# Patient Record
Sex: FEMALE | Race: WHITE | NOT HISPANIC OR LATINO | Employment: FULL TIME | URBAN - METROPOLITAN AREA
[De-identification: names, ages, dates, MRNs, and addresses within clinical notes are randomized per-mention and may not be internally consistent; named-entity substitution may affect disease eponyms.]

---

## 2017-05-02 ENCOUNTER — COMMUNICATION - HEALTHEAST (OUTPATIENT)
Dept: FAMILY MEDICINE | Facility: CLINIC | Age: 52
End: 2017-05-02

## 2017-08-06 ENCOUNTER — COMMUNICATION - HEALTHEAST (OUTPATIENT)
Dept: FAMILY MEDICINE | Facility: CLINIC | Age: 52
End: 2017-08-06

## 2017-08-06 DIAGNOSIS — I10 HYPERTENSION: ICD-10-CM

## 2017-09-27 ENCOUNTER — COMMUNICATION - HEALTHEAST (OUTPATIENT)
Dept: FAMILY MEDICINE | Facility: CLINIC | Age: 52
End: 2017-09-27

## 2017-09-27 DIAGNOSIS — F32.A DEPRESSIVE DISORDER: ICD-10-CM

## 2017-10-14 ENCOUNTER — COMMUNICATION - HEALTHEAST (OUTPATIENT)
Dept: FAMILY MEDICINE | Facility: CLINIC | Age: 52
End: 2017-10-14

## 2017-10-14 DIAGNOSIS — I10 HTN (HYPERTENSION): ICD-10-CM

## 2017-10-14 DIAGNOSIS — I10 HYPERTENSION: ICD-10-CM

## 2017-10-16 ENCOUNTER — COMMUNICATION - HEALTHEAST (OUTPATIENT)
Dept: FAMILY MEDICINE | Facility: CLINIC | Age: 52
End: 2017-10-16

## 2017-10-16 DIAGNOSIS — F32.A DEPRESSIVE DISORDER: ICD-10-CM

## 2017-10-22 ENCOUNTER — COMMUNICATION - HEALTHEAST (OUTPATIENT)
Dept: FAMILY MEDICINE | Facility: CLINIC | Age: 52
End: 2017-10-22

## 2017-10-22 DIAGNOSIS — F32.A DEPRESSIVE DISORDER: ICD-10-CM

## 2017-11-08 ENCOUNTER — COMMUNICATION - HEALTHEAST (OUTPATIENT)
Dept: FAMILY MEDICINE | Facility: CLINIC | Age: 52
End: 2017-11-08

## 2017-11-08 DIAGNOSIS — I10 HYPERTENSION: ICD-10-CM

## 2017-12-09 ENCOUNTER — COMMUNICATION - HEALTHEAST (OUTPATIENT)
Dept: FAMILY MEDICINE | Facility: CLINIC | Age: 52
End: 2017-12-09

## 2017-12-09 DIAGNOSIS — I10 HYPERTENSION: ICD-10-CM

## 2017-12-11 ENCOUNTER — COMMUNICATION - HEALTHEAST (OUTPATIENT)
Dept: FAMILY MEDICINE | Facility: CLINIC | Age: 52
End: 2017-12-11

## 2017-12-11 DIAGNOSIS — I10 HYPERTENSION: ICD-10-CM

## 2017-12-13 ENCOUNTER — COMMUNICATION - HEALTHEAST (OUTPATIENT)
Dept: SCHEDULING | Facility: CLINIC | Age: 52
End: 2017-12-13

## 2017-12-13 ENCOUNTER — AMBULATORY - HEALTHEAST (OUTPATIENT)
Dept: FAMILY MEDICINE | Facility: CLINIC | Age: 52
End: 2017-12-13

## 2017-12-13 DIAGNOSIS — I10 HYPERTENSION, UNSPECIFIED TYPE: ICD-10-CM

## 2017-12-15 ENCOUNTER — OFFICE VISIT - HEALTHEAST (OUTPATIENT)
Dept: FAMILY MEDICINE | Facility: CLINIC | Age: 52
End: 2017-12-15

## 2017-12-15 DIAGNOSIS — I10 ESSENTIAL HYPERTENSION: ICD-10-CM

## 2017-12-15 DIAGNOSIS — I10 HYPERTENSION, UNSPECIFIED TYPE: ICD-10-CM

## 2017-12-15 DIAGNOSIS — Z12.11 SCREEN FOR COLON CANCER: ICD-10-CM

## 2017-12-15 LAB
CHOLEST SERPL-MCNC: 142 MG/DL
FASTING STATUS PATIENT QL REPORTED: NO
HDLC SERPL-MCNC: 46 MG/DL
LDLC SERPL CALC-MCNC: 71 MG/DL
TRIGL SERPL-MCNC: 123 MG/DL

## 2017-12-18 ENCOUNTER — COMMUNICATION - HEALTHEAST (OUTPATIENT)
Dept: FAMILY MEDICINE | Facility: CLINIC | Age: 52
End: 2017-12-18

## 2017-12-20 ENCOUNTER — COMMUNICATION - HEALTHEAST (OUTPATIENT)
Dept: FAMILY MEDICINE | Facility: CLINIC | Age: 52
End: 2017-12-20

## 2018-01-09 ENCOUNTER — COMMUNICATION - HEALTHEAST (OUTPATIENT)
Dept: FAMILY MEDICINE | Facility: CLINIC | Age: 53
End: 2018-01-09

## 2018-01-09 DIAGNOSIS — I10 HTN (HYPERTENSION): ICD-10-CM

## 2018-01-13 ENCOUNTER — COMMUNICATION - HEALTHEAST (OUTPATIENT)
Dept: FAMILY MEDICINE | Facility: CLINIC | Age: 53
End: 2018-01-13

## 2018-01-13 DIAGNOSIS — I10 HYPERTENSION: ICD-10-CM

## 2018-01-21 ENCOUNTER — COMMUNICATION - HEALTHEAST (OUTPATIENT)
Dept: FAMILY MEDICINE | Facility: CLINIC | Age: 53
End: 2018-01-21

## 2018-01-21 DIAGNOSIS — F32.A DEPRESSIVE DISORDER: ICD-10-CM

## 2018-02-07 ENCOUNTER — OFFICE VISIT - HEALTHEAST (OUTPATIENT)
Dept: FAMILY MEDICINE | Facility: CLINIC | Age: 53
End: 2018-02-07

## 2018-02-07 DIAGNOSIS — J32.9 SINUS INFECTION: ICD-10-CM

## 2018-02-07 ASSESSMENT — MIFFLIN-ST. JEOR: SCORE: 1858.25

## 2018-03-13 ENCOUNTER — COMMUNICATION - HEALTHEAST (OUTPATIENT)
Dept: FAMILY MEDICINE | Facility: CLINIC | Age: 53
End: 2018-03-13

## 2018-03-13 DIAGNOSIS — I10 HYPERTENSION, UNSPECIFIED TYPE: ICD-10-CM

## 2018-04-04 ENCOUNTER — COMMUNICATION - HEALTHEAST (OUTPATIENT)
Dept: FAMILY MEDICINE | Facility: CLINIC | Age: 53
End: 2018-04-04

## 2018-04-04 DIAGNOSIS — J32.9 SINUS INFECTION: ICD-10-CM

## 2018-04-24 ENCOUNTER — COMMUNICATION - HEALTHEAST (OUTPATIENT)
Dept: FAMILY MEDICINE | Facility: CLINIC | Age: 53
End: 2018-04-24

## 2018-04-24 DIAGNOSIS — F32.A DEPRESSIVE DISORDER: ICD-10-CM

## 2018-06-03 ENCOUNTER — COMMUNICATION - HEALTHEAST (OUTPATIENT)
Dept: FAMILY MEDICINE | Facility: CLINIC | Age: 53
End: 2018-06-03

## 2018-06-03 DIAGNOSIS — I10 HTN (HYPERTENSION): ICD-10-CM

## 2018-12-27 ENCOUNTER — COMMUNICATION - HEALTHEAST (OUTPATIENT)
Dept: FAMILY MEDICINE | Facility: CLINIC | Age: 53
End: 2018-12-27

## 2018-12-27 DIAGNOSIS — I10 HYPERTENSION, UNSPECIFIED TYPE: ICD-10-CM

## 2019-01-05 ENCOUNTER — COMMUNICATION - HEALTHEAST (OUTPATIENT)
Dept: FAMILY MEDICINE | Facility: CLINIC | Age: 54
End: 2019-01-05

## 2019-01-05 DIAGNOSIS — I10 HYPERTENSION: ICD-10-CM

## 2019-01-05 DIAGNOSIS — I10 HTN (HYPERTENSION): ICD-10-CM

## 2019-03-28 ENCOUNTER — COMMUNICATION - HEALTHEAST (OUTPATIENT)
Dept: FAMILY MEDICINE | Facility: CLINIC | Age: 54
End: 2019-03-28

## 2019-03-28 DIAGNOSIS — I10 HYPERTENSION, UNSPECIFIED TYPE: ICD-10-CM

## 2019-04-03 ENCOUNTER — OFFICE VISIT - HEALTHEAST (OUTPATIENT)
Dept: FAMILY MEDICINE | Facility: CLINIC | Age: 54
End: 2019-04-03

## 2019-04-03 DIAGNOSIS — F32.A DEPRESSIVE DISORDER: ICD-10-CM

## 2019-04-03 DIAGNOSIS — Z12.11 SCREEN FOR COLON CANCER: ICD-10-CM

## 2019-04-03 DIAGNOSIS — Z00.00 ANNUAL PHYSICAL EXAM: ICD-10-CM

## 2019-04-03 DIAGNOSIS — Z12.31 VISIT FOR SCREENING MAMMOGRAM: ICD-10-CM

## 2019-04-03 DIAGNOSIS — L98.9 SKIN LESION: ICD-10-CM

## 2019-04-03 DIAGNOSIS — E66.01 MORBID OBESITY (H): ICD-10-CM

## 2019-04-18 ENCOUNTER — COMMUNICATION - HEALTHEAST (OUTPATIENT)
Dept: ADMINISTRATIVE | Facility: CLINIC | Age: 54
End: 2019-04-18

## 2019-05-03 ENCOUNTER — COMMUNICATION - HEALTHEAST (OUTPATIENT)
Dept: FAMILY MEDICINE | Facility: CLINIC | Age: 54
End: 2019-05-03

## 2019-05-04 ENCOUNTER — COMMUNICATION - HEALTHEAST (OUTPATIENT)
Dept: FAMILY MEDICINE | Facility: CLINIC | Age: 54
End: 2019-05-04

## 2019-05-04 DIAGNOSIS — I10 HYPERTENSION, UNSPECIFIED TYPE: ICD-10-CM

## 2019-05-08 ENCOUNTER — COMMUNICATION - HEALTHEAST (OUTPATIENT)
Dept: FAMILY MEDICINE | Facility: CLINIC | Age: 54
End: 2019-05-08

## 2019-05-08 DIAGNOSIS — F32.A DEPRESSIVE DISORDER: ICD-10-CM

## 2019-05-29 DIAGNOSIS — J01.00 ACUTE NON-RECURRENT MAXILLARY SINUSITIS: ICD-10-CM

## 2019-06-06 ENCOUNTER — COMMUNICATION - HEALTHEAST (OUTPATIENT)
Dept: FAMILY MEDICINE | Facility: CLINIC | Age: 54
End: 2019-06-06

## 2019-06-06 DIAGNOSIS — I10 HYPERTENSION, UNSPECIFIED TYPE: ICD-10-CM

## 2019-07-14 ENCOUNTER — COMMUNICATION - HEALTHEAST (OUTPATIENT)
Dept: FAMILY MEDICINE | Facility: CLINIC | Age: 54
End: 2019-07-14

## 2019-07-14 DIAGNOSIS — I10 HTN (HYPERTENSION): ICD-10-CM

## 2019-07-15 ENCOUNTER — COMMUNICATION - HEALTHEAST (OUTPATIENT)
Dept: FAMILY MEDICINE | Facility: CLINIC | Age: 54
End: 2019-07-15

## 2019-07-15 DIAGNOSIS — I10 HYPERTENSION, UNSPECIFIED TYPE: ICD-10-CM

## 2019-08-01 ENCOUNTER — COMMUNICATION - HEALTHEAST (OUTPATIENT)
Dept: FAMILY MEDICINE | Facility: CLINIC | Age: 54
End: 2019-08-01

## 2019-08-09 ENCOUNTER — COMMUNICATION - HEALTHEAST (OUTPATIENT)
Dept: FAMILY MEDICINE | Facility: CLINIC | Age: 54
End: 2019-08-09

## 2019-08-12 ENCOUNTER — COMMUNICATION - HEALTHEAST (OUTPATIENT)
Dept: FAMILY MEDICINE | Facility: CLINIC | Age: 54
End: 2019-08-12

## 2019-08-12 DIAGNOSIS — I10 HYPERTENSION, UNSPECIFIED TYPE: ICD-10-CM

## 2019-08-13 ENCOUNTER — COMMUNICATION - HEALTHEAST (OUTPATIENT)
Dept: FAMILY MEDICINE | Facility: CLINIC | Age: 54
End: 2019-08-13

## 2019-08-13 DIAGNOSIS — I10 ESSENTIAL HYPERTENSION: ICD-10-CM

## 2019-10-03 ENCOUNTER — COMMUNICATION - HEALTHEAST (OUTPATIENT)
Dept: FAMILY MEDICINE | Facility: CLINIC | Age: 54
End: 2019-10-03

## 2019-10-03 DIAGNOSIS — I10 HYPERTENSION, UNSPECIFIED TYPE: ICD-10-CM

## 2019-10-05 ENCOUNTER — COMMUNICATION - HEALTHEAST (OUTPATIENT)
Dept: FAMILY MEDICINE | Facility: CLINIC | Age: 54
End: 2019-10-05

## 2019-10-05 DIAGNOSIS — I10 HTN (HYPERTENSION): ICD-10-CM

## 2019-10-31 ENCOUNTER — COMMUNICATION - HEALTHEAST (OUTPATIENT)
Dept: FAMILY MEDICINE | Facility: CLINIC | Age: 54
End: 2019-10-31

## 2019-10-31 DIAGNOSIS — I10 HYPERTENSION, UNSPECIFIED TYPE: ICD-10-CM

## 2019-12-04 ENCOUNTER — COMMUNICATION - HEALTHEAST (OUTPATIENT)
Dept: FAMILY MEDICINE | Facility: CLINIC | Age: 54
End: 2019-12-04

## 2019-12-04 DIAGNOSIS — I10 HYPERTENSION, UNSPECIFIED TYPE: ICD-10-CM

## 2020-01-03 ENCOUNTER — COMMUNICATION - HEALTHEAST (OUTPATIENT)
Dept: FAMILY MEDICINE | Facility: CLINIC | Age: 55
End: 2020-01-03

## 2020-01-03 DIAGNOSIS — I10 HYPERTENSION: ICD-10-CM

## 2020-02-07 ENCOUNTER — COMMUNICATION - HEALTHEAST (OUTPATIENT)
Dept: FAMILY MEDICINE | Facility: CLINIC | Age: 55
End: 2020-02-07

## 2020-02-07 DIAGNOSIS — I10 HYPERTENSION: ICD-10-CM

## 2020-03-26 ENCOUNTER — COMMUNICATION - HEALTHEAST (OUTPATIENT)
Dept: FAMILY MEDICINE | Facility: CLINIC | Age: 55
End: 2020-03-26

## 2020-03-26 DIAGNOSIS — I10 HTN (HYPERTENSION): ICD-10-CM

## 2020-04-23 ENCOUNTER — COMMUNICATION - HEALTHEAST (OUTPATIENT)
Dept: FAMILY MEDICINE | Facility: CLINIC | Age: 55
End: 2020-04-23

## 2020-04-23 DIAGNOSIS — F32.A DEPRESSIVE DISORDER: ICD-10-CM

## 2020-05-09 ENCOUNTER — COMMUNICATION - HEALTHEAST (OUTPATIENT)
Dept: FAMILY MEDICINE | Facility: CLINIC | Age: 55
End: 2020-05-09

## 2020-05-09 DIAGNOSIS — I10 HYPERTENSION: ICD-10-CM

## 2020-06-09 ENCOUNTER — COMMUNICATION - HEALTHEAST (OUTPATIENT)
Dept: FAMILY MEDICINE | Facility: CLINIC | Age: 55
End: 2020-06-09

## 2020-06-09 DIAGNOSIS — I10 HYPERTENSION, UNSPECIFIED TYPE: ICD-10-CM

## 2020-06-09 DIAGNOSIS — I10 HYPERTENSION: ICD-10-CM

## 2020-06-16 ENCOUNTER — COMMUNICATION - HEALTHEAST (OUTPATIENT)
Dept: FAMILY MEDICINE | Facility: CLINIC | Age: 55
End: 2020-06-16

## 2020-06-16 DIAGNOSIS — I10 HTN (HYPERTENSION): ICD-10-CM

## 2020-06-17 ENCOUNTER — OFFICE VISIT - HEALTHEAST (OUTPATIENT)
Dept: FAMILY MEDICINE | Facility: CLINIC | Age: 55
End: 2020-06-17

## 2020-06-17 DIAGNOSIS — B49 FUNGAL INFECTION: ICD-10-CM

## 2020-06-17 DIAGNOSIS — F32.A DEPRESSIVE DISORDER: ICD-10-CM

## 2020-06-17 DIAGNOSIS — M79.10 MUSCLE ACHE: ICD-10-CM

## 2020-06-17 DIAGNOSIS — Z12.31 VISIT FOR SCREENING MAMMOGRAM: ICD-10-CM

## 2020-06-17 DIAGNOSIS — I10 HYPERTENSION, UNSPECIFIED TYPE: ICD-10-CM

## 2020-06-17 RX ORDER — SPIRONOLACTONE 50 MG/1
50 TABLET, FILM COATED ORAL DAILY
Qty: 90 TABLET | Refills: 3 | Status: SHIPPED | OUTPATIENT
Start: 2020-06-17 | End: 2021-07-23

## 2020-06-17 RX ORDER — CYCLOBENZAPRINE HCL 5 MG
5 TABLET ORAL EVERY 8 HOURS PRN
Qty: 30 TABLET | Refills: 1 | Status: SHIPPED | OUTPATIENT
Start: 2020-06-17 | End: 2021-10-04

## 2020-06-17 RX ORDER — LOSARTAN POTASSIUM 100 MG/1
100 TABLET ORAL DAILY
Qty: 90 TABLET | Refills: 3 | Status: SHIPPED | OUTPATIENT
Start: 2020-06-17 | End: 2021-07-23

## 2020-06-17 RX ORDER — HYDROCHLOROTHIAZIDE 25 MG/1
25 TABLET ORAL DAILY
Qty: 90 TABLET | Refills: 3 | Status: SHIPPED | OUTPATIENT
Start: 2020-06-17 | End: 2021-07-23

## 2020-06-17 RX ORDER — METOPROLOL SUCCINATE 100 MG/1
150 TABLET, EXTENDED RELEASE ORAL DAILY
Qty: 135 TABLET | Refills: 3 | Status: SHIPPED | OUTPATIENT
Start: 2020-06-17 | End: 2021-07-23

## 2020-06-17 RX ORDER — NYSTATIN 100000/G
POWDER (GRAM) TOPICAL
Qty: 15 G | Refills: 0 | Status: SHIPPED | OUTPATIENT
Start: 2020-06-17 | End: 2021-06-17

## 2020-06-17 RX ORDER — ESCITALOPRAM OXALATE 10 MG/1
10 TABLET ORAL DAILY
Qty: 90 TABLET | Refills: 3 | Status: SHIPPED | OUTPATIENT
Start: 2020-06-17 | End: 2021-07-23

## 2020-06-17 ASSESSMENT — PATIENT HEALTH QUESTIONNAIRE - PHQ9: SUM OF ALL RESPONSES TO PHQ QUESTIONS 1-9: 0

## 2020-07-11 ENCOUNTER — COMMUNICATION - HEALTHEAST (OUTPATIENT)
Dept: FAMILY MEDICINE | Facility: CLINIC | Age: 55
End: 2020-07-11

## 2020-07-11 DIAGNOSIS — F32.A DEPRESSIVE DISORDER: ICD-10-CM

## 2020-12-07 ENCOUNTER — COMMUNICATION - HEALTHEAST (OUTPATIENT)
Dept: FAMILY MEDICINE | Facility: CLINIC | Age: 55
End: 2020-12-07

## 2021-05-27 ASSESSMENT — PATIENT HEALTH QUESTIONNAIRE - PHQ9: SUM OF ALL RESPONSES TO PHQ QUESTIONS 1-9: 0

## 2021-05-27 NOTE — TELEPHONE ENCOUNTER
RN cannot approve Refill Request    RN can NOT refill this medication PCP messaged that patient is overdue for Labs and Office Visit.      Yvette Palacios, [facility] Triage/Med Refill 3/28/2019    Requested Prescriptions   Pending Prescriptions Disp Refills     losartan-hydrochlorothiazide (HYZAAR) 100-25 mg per tablet [Pharmacy Med Name: LOSARTAN-HCTZ 100-25 MG TAB] 90 tablet 3     Sig: TAKE 1 TABLET BY MOUTH EVERY DAY    Diuretics/Combination Diuretics Refill Protocol  Failed - 3/28/2019  8:02 AM       Failed - Visit with PCP or prescribing provider visit in past 12 months    Last office visit with prescriber/PCP: 2/7/2018 Dang Don FNP OR same dept: Visit date not found OR same specialty: 2/7/2018 Dang Don FNP  Last physical: Visit date not found Last MTM visit: Visit date not found   Next visit within 3 mo: Visit date not found  Next physical within 3 mo: Visit date not found  Prescriber OR PCP: ZULEIKA Franklin  Last diagnosis associated with med order: 1. Hypertension, unspecified type  - losartan-hydrochlorothiazide (HYZAAR) 100-25 mg per tablet [Pharmacy Med Name: LOSARTAN-HCTZ 100-25 MG TAB]; TAKE 1 TABLET BY MOUTH EVERY DAY  Dispense: 90 tablet; Refill: 0    If protocol passes may refill for 12 months if within 3 months of last provider visit (or a total of 15 months).            Failed - Serum Potassium in past 12 months     No results found for: LN-POTASSIUM         Failed - Serum Sodium in past 12 months     No results found for: LN-SODIUM         Failed - Blood pressure on file in past 12 months    BP Readings from Last 1 Encounters:   02/07/18 (!) 150/110            Failed - Serum Creatinine in past 12 months     Creatinine   Date Value Ref Range Status   12/15/2017 0.79 0.60 - 1.10 mg/dL Final

## 2021-05-28 NOTE — TELEPHONE ENCOUNTER
RN cannot approve Refill Request    RN can NOT refill this medication request is for higher dose than what was prescribed on 4/3/19.     Donny Donis, [facility] Triage/Med Refill 5/8/2019    Requested Prescriptions   Refused Prescriptions Disp Refills     escitalopram oxalate (LEXAPRO) 20 MG tablet [Pharmacy Med Name: ESCITALOPRAM 20 MG TABLET] 90 tablet 0     Sig: TAKE 1 TABLET (20 MG TOTAL) BY MOUTH DAILY.       [facility] Refill Protocol  Passed - 5/8/2019  1:25 AM        Passed - PCP or prescribing provider visit in last year     Last office visit with prescriber/PCP: 2/7/2018 Dang Don FNP OR same dept: Visit date not found OR same specialty: 2/7/2018 Dang Don FNP  Last physical: 4/3/2019 Last MTM visit: Visit date not found   Next visit within 3 mo: Visit date not found  Next physical within 3 mo: Visit date not found  Prescriber OR PCP: ZULEIKA Franklin  Last diagnosis associated with med order: 1. Depressive disorder  - escitalopram oxalate (LEXAPRO) 20 MG tablet [Pharmacy Med Name: ESCITALOPRAM 20 MG TABLET]; TAKE 1 TABLET (20 MG TOTAL) BY MOUTH DAILY.  Dispense: 90 tablet; Refill: 0    If protocol passes may refill for 12 months if within 3 months of last provider visit (or a total of 15 months).

## 2021-05-28 NOTE — TELEPHONE ENCOUNTER
RN cannot approve Refill Request    RN can NOT refill this medication Protocol failed and NO refill given. Last office visit: 2/7/2018 Dang Don FNP Last Physical: 4/3/2019 Last MTM visit: Visit date not found Last visit same specialty: 2/7/2018 Dang Don FNP.  Next visit within 3 mo: Visit date not found  Next physical within 3 mo: Visit date not found      Ann-Marie Richey, [facility] Triage/Med Refill 5/5/2019    Requested Prescriptions   Pending Prescriptions Disp Refills     losartan-hydrochlorothiazide (HYZAAR) 100-25 mg per tablet [Pharmacy Med Name: LOSARTAN-HCTZ 100-25 MG TAB] 30 tablet 0     Sig: TAKE 1 TABLET BY MOUTH EVERY DAY       Diuretics/Combination Diuretics Refill Protocol  Failed - 5/4/2019 12:27 PM        Failed - Serum Potassium in past 12 months      No results found for: LN-POTASSIUM          Failed - Serum Sodium in past 12 months      No results found for: LN-SODIUM          Failed - Serum Creatinine in past 12 months      Creatinine   Date Value Ref Range Status   12/15/2017 0.79 0.60 - 1.10 mg/dL Final             Passed - Visit with PCP or prescribing provider visit in past 12 months     Last office visit with prescriber/PCP: 2/7/2018 Dang Don FNP OR same dept: Visit date not found OR same specialty: 2/7/2018 Dang Don FNP  Last physical: 4/3/2019 Last MTM visit: Visit date not found   Next visit within 3 mo: Visit date not found  Next physical within 3 mo: Visit date not found  Prescriber OR PCP: ZULEIKA Franklin  Last diagnosis associated with med order: 1. Hypertension, unspecified type  - losartan-hydrochlorothiazide (HYZAAR) 100-25 mg per tablet [Pharmacy Med Name: LOSARTAN-HCTZ 100-25 MG TAB]; TAKE 1 TABLET BY MOUTH EVERY DAY  Dispense: 30 tablet; Refill: 0    If protocol passes may refill for 12 months if within 3 months of last provider visit (or a total of 15 months).             Passed - Blood pressure on file in past 12  months     BP Readings from Last 1 Encounters:   04/03/19 110/74

## 2021-05-29 NOTE — TELEPHONE ENCOUNTER
RN cannot approve Refill Request    RN can NOT refill this medication Protocol failed and NO refill given. Last office visit: 2/7/2018 Dang Don FNP Last Physical: 4/3/2019 Last MTM visit: Visit date not found Last visit same specialty: 2/7/2018 Dang Don FNP.  Next visit within 3 mo: Visit date not found  Next physical within 3 mo: Visit date not found      Tatum Ball, [facility] Triage/Med Refill 6/8/2019    Requested Prescriptions   Pending Prescriptions Disp Refills     losartan-hydrochlorothiazide (HYZAAR) 100-25 mg per tablet [Pharmacy Med Name: LOSARTAN-HCTZ 100-25 MG TAB] 30 tablet 0     Sig: TAKE 1 TABLET BY MOUTH EVERY DAY       Diuretics/Combination Diuretics Refill Protocol  Failed - 6/6/2019  1:31 AM        Failed - Serum Potassium in past 12 months      No results found for: LN-POTASSIUM          Failed - Serum Sodium in past 12 months      No results found for: LN-SODIUM          Failed - Serum Creatinine in past 12 months      Creatinine   Date Value Ref Range Status   12/15/2017 0.79 0.60 - 1.10 mg/dL Final             Passed - Visit with PCP or prescribing provider visit in past 12 months     Last office visit with prescriber/PCP: 2/7/2018 Dang Don FNP OR same dept: Visit date not found OR same specialty: 2/7/2018 Dang Don FNP  Last physical: 4/3/2019 Last MTM visit: Visit date not found   Next visit within 3 mo: Visit date not found  Next physical within 3 mo: Visit date not found  Prescriber OR PCP: ZULEIKA Franklin  Last diagnosis associated with med order: 1. Hypertension, unspecified type  - losartan-hydrochlorothiazide (HYZAAR) 100-25 mg per tablet [Pharmacy Med Name: LOSARTAN-HCTZ 100-25 MG TAB]; TAKE 1 TABLET BY MOUTH EVERY DAY  Dispense: 30 tablet; Refill: 0    If protocol passes may refill for 12 months if within 3 months of last provider visit (or a total of 15 months).             Passed - Blood pressure on file in past 12  months     BP Readings from Last 1 Encounters:   04/03/19 110/74

## 2021-05-30 NOTE — TELEPHONE ENCOUNTER
RN cannot approve Refill Request    RN can NOT refill this medication PCP messaged that patient is overdue for Labs. Last office visit: 2/7/2018 Dang Don FNP Last Physical: 4/3/2019 Last MTM visit: Visit date not found Last visit same specialty: 2/7/2018 Dang Don FNP.  Next visit within 3 mo: Visit date not found  Next physical within 3 mo: Visit date not found      Catalina Rueda, [facility] Triage/Med Refill 7/16/2019    Requested Prescriptions   Pending Prescriptions Disp Refills     losartan-hydrochlorothiazide (HYZAAR) 100-25 mg per tablet [Pharmacy Med Name: LOSARTAN-HCTZ 100-25 MG TAB] 30 tablet 0     Sig: TAKE 1 TABLET BY MOUTH EVERY DAY       Diuretics/Combination Diuretics Refill Protocol  Failed - 7/15/2019  1:32 PM        Failed - Serum Potassium in past 12 months      No results found for: LN-POTASSIUM          Failed - Serum Sodium in past 12 months      No results found for: LN-SODIUM          Failed - Serum Creatinine in past 12 months      Creatinine   Date Value Ref Range Status   12/15/2017 0.79 0.60 - 1.10 mg/dL Final             Passed - Visit with PCP or prescribing provider visit in past 12 months     Last office visit with prescriber/PCP: 2/7/2018 Dang Don FNP OR same dept: Visit date not found OR same specialty: 2/7/2018 Dang Don FNP  Last physical: 4/3/2019 Last MTM visit: Visit date not found   Next visit within 3 mo: Visit date not found  Next physical within 3 mo: Visit date not found  Prescriber OR PCP: ZULEIKA Franklin  Last diagnosis associated with med order: 1. Hypertension, unspecified type  - losartan-hydrochlorothiazide (HYZAAR) 100-25 mg per tablet [Pharmacy Med Name: LOSARTAN-HCTZ 100-25 MG TAB]; TAKE 1 TABLET BY MOUTH EVERY DAY  Dispense: 30 tablet; Refill: 0    If protocol passes may refill for 12 months if within 3 months of last provider visit (or a total of 15 months).             Passed - Blood pressure on file in past  12 months     BP Readings from Last 1 Encounters:   04/03/19 110/74

## 2021-05-30 NOTE — TELEPHONE ENCOUNTER
Refill Approved    Rx renewed per Medication Renewal Policy. Medication was last renewed on 1/6/19  OV 4/3/19.    Natalie Simmons, [facility] Triage/Med Refill 7/14/2019     Requested Prescriptions   Pending Prescriptions Disp Refills     metoprolol succinate (TOPROL-XL) 100 MG 24 hr tablet [Pharmacy Med Name: METOPROLOL SUCC  MG TAB] 135 tablet 1     Sig: TAKE 1 & 1/2 TABLETS BY MOUTH EVERY DAY       Beta-Blockers Refill Protocol Passed - 7/14/2019 11:31 AM        Passed - PCP or prescribing provider visit in past 12 months or next 3 months     Last office visit with prescriber/PCP: 2/7/2018 Dang Don FNP OR same dept: Visit date not found OR same specialty: 2/7/2018 Dang Don FNP  Last physical: 4/3/2019 Last MTM visit: Visit date not found   Next visit within 3 mo: Visit date not found  Next physical within 3 mo: Visit date not found  Prescriber OR PCP: ZULEIKA Franklin  Last diagnosis associated with med order: 1. HTN (hypertension)  - metoprolol succinate (TOPROL-XL) 100 MG 24 hr tablet [Pharmacy Med Name: METOPROLOL SUCC  MG TAB]; TAKE 1 & 1/2 TABLETS BY MOUTH EVERY DAY  Dispense: 135 tablet; Refill: 1    If protocol passes may refill for 12 months if within 3 months of last provider visit (or a total of 15 months).             Passed - Blood pressure filed in past 12 months     BP Readings from Last 1 Encounters:   04/03/19 110/74

## 2021-05-31 ENCOUNTER — RECORDS - HEALTHEAST (OUTPATIENT)
Dept: ADMINISTRATIVE | Facility: CLINIC | Age: 56
End: 2021-05-31

## 2021-05-31 VITALS — WEIGHT: 284.2 LBS | BODY MASS INDEX: 50.36 KG/M2 | HEIGHT: 63 IN

## 2021-05-31 VITALS — BODY MASS INDEX: 51.42 KG/M2 | WEIGHT: 290.3 LBS

## 2021-05-31 NOTE — TELEPHONE ENCOUNTER
RN cannot approve Refill Request    RN can NOT refill this medication PCP messaged that patient is overdue for Labs. Last office visit: 2/7/2018 Dang Don FNP Last Physical: 4/3/2019 Last MTM visit: Visit date not found Last visit same specialty: 2/7/2018 Dang Don FNP.  Next visit within 3 mo: Visit date not found  Next physical within 3 mo: Visit date not found      Krista A Ludwin, [facility] Triage/Med Refill 8/12/2019    Requested Prescriptions   Pending Prescriptions Disp Refills     losartan-hydrochlorothiazide (HYZAAR) 100-25 mg per tablet [Pharmacy Med Name: LOSARTAN-HCTZ 100-25 MG TAB] 30 tablet 0     Sig: TAKE 1 TABLET BY MOUTH EVERY DAY       Diuretics/Combination Diuretics Refill Protocol  Failed - 8/12/2019  9:33 AM        Failed - Serum Potassium in past 12 months      No results found for: LN-POTASSIUM          Failed - Serum Sodium in past 12 months      No results found for: LN-SODIUM          Failed - Serum Creatinine in past 12 months      Creatinine   Date Value Ref Range Status   12/15/2017 0.79 0.60 - 1.10 mg/dL Final             Passed - Visit with PCP or prescribing provider visit in past 12 months     Last office visit with prescriber/PCP: 2/7/2018 Dang Don FNP OR same dept: Visit date not found OR same specialty: 2/7/2018 Dang Don FNP  Last physical: 4/3/2019 Last MTM visit: Visit date not found   Next visit within 3 mo: Visit date not found  Next physical within 3 mo: Visit date not found  Prescriber OR PCP: ZULEIKA Franklin  Last diagnosis associated with med order: 1. Hypertension, unspecified type  - losartan-hydrochlorothiazide (HYZAAR) 100-25 mg per tablet [Pharmacy Med Name: LOSARTAN-HCTZ 100-25 MG TAB]; TAKE 1 TABLET BY MOUTH EVERY DAY  Dispense: 30 tablet; Refill: 0    If protocol passes may refill for 12 months if within 3 months of last provider visit (or a total of 15 months).             Passed - Blood pressure on file in  past 12 months     BP Readings from Last 1 Encounters:   04/03/19 110/74

## 2021-06-02 VITALS — BODY MASS INDEX: 50.84 KG/M2 | WEIGHT: 287 LBS

## 2021-06-02 NOTE — TELEPHONE ENCOUNTER
RN cannot approve Refill Request    RN can NOT refill this medication PCP messaged that patient is overdue for Labs. Last office visit: 2/7/2018 Dang Don FNP Last Physical: 4/3/2019 Last MTM visit: Visit date not found Last visit same specialty: 2/7/2018 Dang Don FNP.  Next visit within 3 mo: Visit date not found  Next physical within 3 mo: Visit date not found      Jez Crawford, [facility] Triage/Med Refill 10/31/2019    Requested Prescriptions   Pending Prescriptions Disp Refills     losartan-hydrochlorothiazide (HYZAAR) 100-25 mg per tablet [Pharmacy Med Name: LOSARTAN-HCTZ 100-25 MG TAB] 30 tablet 0     Sig: TAKE 1 TABLET BY MOUTH EVERY DAY       Diuretics/Combination Diuretics Refill Protocol  Failed - 10/31/2019  7:37 AM        Failed - Serum Potassium in past 12 months      No results found for: LN-POTASSIUM          Failed - Serum Sodium in past 12 months      No results found for: LN-SODIUM          Failed - Serum Creatinine in past 12 months      Creatinine   Date Value Ref Range Status   12/15/2017 0.79 0.60 - 1.10 mg/dL Final             Passed - Visit with PCP or prescribing provider visit in past 12 months     Last office visit with prescriber/PCP: 2/7/2018 Dang Don FNP OR same dept: Visit date not found OR same specialty: 2/7/2018 Dang Don FNP  Last physical: 4/3/2019 Last MTM visit: Visit date not found   Next visit within 3 mo: Visit date not found  Next physical within 3 mo: Visit date not found  Prescriber OR PCP: ZLUEIKA Franklin  Last diagnosis associated with med order: 1. Hypertension, unspecified type  - losartan-hydrochlorothiazide (HYZAAR) 100-25 mg per tablet [Pharmacy Med Name: LOSARTAN-HCTZ 100-25 MG TAB]; TAKE 1 TABLET BY MOUTH EVERY DAY  Dispense: 30 tablet; Refill: 0    If protocol passes may refill for 12 months if within 3 months of last provider visit (or a total of 15 months).             Passed - Blood pressure on file in  past 12 months     BP Readings from Last 1 Encounters:   04/03/19 110/74

## 2021-06-02 NOTE — TELEPHONE ENCOUNTER
RN cannot approve Refill Request    RN can NOT refill this medication PCP messaged that patient is overdue for Labs. Last office visit: 2/7/2018 Dang Don FNP Last Physical: 4/3/2019 Last MTM visit: Visit date not found Last visit same specialty: 2/7/2018 Dang Don FNP.  Next visit within 3 mo: Visit date not found  Next physical within 3 mo: Visit date not found      Jez Crawford, [facility] Triage/Med Refill 10/3/2019    Requested Prescriptions   Pending Prescriptions Disp Refills     losartan-hydrochlorothiazide (HYZAAR) 100-25 mg per tablet [Pharmacy Med Name: LOSARTAN-HCTZ 100-25 MG TAB] 30 tablet 0     Sig: TAKE 1 TABLET BY MOUTH EVERY DAY       Diuretics/Combination Diuretics Refill Protocol  Failed - 10/3/2019  9:34 AM        Failed - Serum Potassium in past 12 months      No results found for: LN-POTASSIUM          Failed - Serum Sodium in past 12 months      No results found for: LN-SODIUM          Failed - Serum Creatinine in past 12 months      Creatinine   Date Value Ref Range Status   12/15/2017 0.79 0.60 - 1.10 mg/dL Final             Passed - Visit with PCP or prescribing provider visit in past 12 months     Last office visit with prescriber/PCP: 2/7/2018 Dang Don FNP OR same dept: Visit date not found OR same specialty: 2/7/2018 Dang Don FNP  Last physical: 4/3/2019 Last MTM visit: Visit date not found   Next visit within 3 mo: Visit date not found  Next physical within 3 mo: Visit date not found  Prescriber OR PCP: ZULEIKA Franklin  Last diagnosis associated with med order: 1. Hypertension, unspecified type  - losartan-hydrochlorothiazide (HYZAAR) 100-25 mg per tablet [Pharmacy Med Name: LOSARTAN-HCTZ 100-25 MG TAB]; TAKE 1 TABLET BY MOUTH EVERY DAY  Dispense: 30 tablet; Refill: 0    If protocol passes may refill for 12 months if within 3 months of last provider visit (or a total of 15 months).             Passed - Blood pressure on file in  past 12 months     BP Readings from Last 1 Encounters:   04/03/19 110/74

## 2021-06-02 NOTE — TELEPHONE ENCOUNTER
Refill Approved    Rx renewed per Medication Renewal Policy. Medication was last renewed on 7/14/19.    Yvette Palacios, [facility] Triage/Med Refill 10/7/2019     Requested Prescriptions   Pending Prescriptions Disp Refills     metoprolol succinate (TOPROL-XL) 100 MG 24 hr tablet [Pharmacy Med Name: METOPROLOL SUCC  MG TAB] 135 tablet 0     Sig: TAKE 1.5 TABLETS (150 MG TOTAL) BY MOUTH DAILY.       Beta-Blockers Refill Protocol Passed - 10/5/2019  9:02 AM        Passed - PCP or prescribing provider visit in past 12 months or next 3 months     Last office visit with prescriber/PCP: 2/7/2018 Dang Don FNP OR same dept: Visit date not found OR same specialty: 2/7/2018 Dang Don FNP  Last physical: 4/3/2019 Last MTM visit: Visit date not found   Next visit within 3 mo: Visit date not found  Next physical within 3 mo: Visit date not found  Prescriber OR PCP: ZULEIKA Farnklin  Last diagnosis associated with med order: 1. HTN (hypertension)  - metoprolol succinate (TOPROL-XL) 100 MG 24 hr tablet [Pharmacy Med Name: METOPROLOL SUCC  MG TAB]; Take 1.5 tablets (150 mg total) by mouth daily.  Dispense: 135 tablet; Refill: 0    If protocol passes may refill for 12 months if within 3 months of last provider visit (or a total of 15 months).             Passed - Blood pressure filed in past 12 months     BP Readings from Last 1 Encounters:   04/03/19 110/74

## 2021-06-04 NOTE — TELEPHONE ENCOUNTER
RN cannot approve Refill Request    RN can NOT refill this medication PCP messaged that patient is overdue for Labs. Last office visit: 2/7/2018 Dang Don FNP Last Physical: 4/3/2019 Last MTM visit: Visit date not found Last visit same specialty: 2/7/2018 Dang Don FNP.  Next visit within 3 mo: Visit date not found  Next physical within 3 mo: Visit date not found      Jez Crawford, [facility] Triage/Med Refill 1/4/2020    Requested Prescriptions   Pending Prescriptions Disp Refills     spironolactone (ALDACTONE) 50 MG tablet [Pharmacy Med Name: SPIRONOLACTONE 50 MG TABLET] 90 tablet 3     Sig: TAKE 1 TABLET (50 MG TOTAL) BY MOUTH DAILY.       Diuretics/Combination Diuretics Refill Protocol  Failed - 1/3/2020  8:56 AM        Failed - Serum Potassium in past 12 months      No results found for: LN-POTASSIUM          Failed - Serum Sodium in past 12 months      No results found for: LN-SODIUM          Failed - Serum Creatinine in past 12 months      Creatinine   Date Value Ref Range Status   12/15/2017 0.79 0.60 - 1.10 mg/dL Final             Passed - Visit with PCP or prescribing provider visit in past 12 months     Last office visit with prescriber/PCP: 2/7/2018 Dang Don FNP OR same dept: Visit date not found OR same specialty: 2/7/2018 Dang Don FNP  Last physical: 4/3/2019 Last MTM visit: Visit date not found   Next visit within 3 mo: Visit date not found  Next physical within 3 mo: Visit date not found  Prescriber OR PCP: ZULEIKA Franklin  Last diagnosis associated with med order: 1. Hypertension  - spironolactone (ALDACTONE) 50 MG tablet [Pharmacy Med Name: SPIRONOLACTONE 50 MG TABLET]; TAKE 1 TABLET (50 MG TOTAL) BY MOUTH DAILY.  Dispense: 90 tablet; Refill: 3    If protocol passes may refill for 12 months if within 3 months of last provider visit (or a total of 15 months).             Passed - Blood pressure on file in past 12 months     BP Readings from  Last 1 Encounters:   04/03/19 110/74

## 2021-06-04 NOTE — TELEPHONE ENCOUNTER
Medication Question or Clarification  Who is calling: Pharmacy: [facility]  What medication are you calling about?: hyzaar  What dose do you take?: 100-25 mg  How often are you taking the medication?: daily  Who prescribed the medication?: Arian  What is your question/concern?: onback order- please send new rx for 2 separate meds  Pharmacy: [facility]  Okay to leave a detailed message?: Yes  Site CMT - Please call the pharmacy to obtain any additional needed information.      RN cannot approve Refill Request    RN can NOT refill this medication Protocol failed and NO refill given.     Yvette Palacios, [facility] Triage/Med Refill 12/6/2019    Requested Prescriptions   Pending Prescriptions Disp Refills     losartan-hydrochlorothiazide (HYZAAR) 100-25 mg per tablet [Pharmacy Med Name: LOSARTAN-HCTZ 100-25 MG TAB] 30 tablet 0     Sig: TAKE 1 TABLET BY MOUTH EVERY DAY       Diuretics/Combination Diuretics Refill Protocol  Failed - 12/4/2019 10:36 AM        Failed - Serum Potassium in past 12 months      No results found for: LN-POTASSIUM          Failed - Serum Sodium in past 12 months      No results found for: LN-SODIUM          Failed - Serum Creatinine in past 12 months      Creatinine   Date Value Ref Range Status   12/15/2017 0.79 0.60 - 1.10 mg/dL Final             Passed - Visit with PCP or prescribing provider visit in past 12 months     Last office visit with prescriber/PCP: 2/7/2018 Dang Don FNP OR same dept: Visit date not found OR same specialty: 2/7/2018 Dang Don FNP  Last physical: 4/3/2019 Last MTM visit: Visit date not found   Next visit within 3 mo: Visit date not found  Next physical within 3 mo: Visit date not found  Prescriber OR PCP: ZULEIKA Franklin  Last diagnosis associated with med order: 1. Hypertension, unspecified type  - losartan-hydrochlorothiazide (HYZAAR) 100-25 mg per tablet [Pharmacy Med Name: LOSARTAN-HCTZ 100-25 MG TAB]; TAKE 1 TABLET BY MOUTH EVERY DAY  Dispense:  30 tablet; Refill: 0    If protocol passes may refill for 12 months if within 3 months of last provider visit (or a total of 15 months).             Passed - Blood pressure on file in past 12 months     BP Readings from Last 1 Encounters:   04/03/19 110/74

## 2021-06-05 NOTE — TELEPHONE ENCOUNTER
RN cannot approve Refill Request    RN can NOT refill this medication Protocol failed and NO refill given.       Yvette Palacios, [facility] Triage/Med Refill 2/8/2020    Requested Prescriptions   Pending Prescriptions Disp Refills     spironolactone (ALDACTONE) 50 MG tablet [Pharmacy Med Name: SPIRONOLACTONE 50 MG TABLET] 90 tablet 3     Sig: TAKE 1 TABLET (50 MG TOTAL) BY MOUTH DAILY.       Diuretics/Combination Diuretics Refill Protocol  Failed - 2/7/2020  3:03 PM        Failed - Serum Potassium in past 12 months      No results found for: LN-POTASSIUM          Failed - Serum Sodium in past 12 months      No results found for: LN-SODIUM          Failed - Serum Creatinine in past 12 months      Creatinine   Date Value Ref Range Status   12/15/2017 0.79 0.60 - 1.10 mg/dL Final             Passed - Visit with PCP or prescribing provider visit in past 12 months     Last office visit with prescriber/PCP: 2/7/2018 Dang Don FNP OR same dept: Visit date not found OR same specialty: 2/7/2018 Dang Don FNP  Last physical: 4/3/2019 Last MTM visit: Visit date not found   Next visit within 3 mo: Visit date not found  Next physical within 3 mo: Visit date not found  Prescriber OR PCP: ZULEIKA Franklin  Last diagnosis associated with med order: 1. Hypertension  - spironolactone (ALDACTONE) 50 MG tablet [Pharmacy Med Name: SPIRONOLACTONE 50 MG TABLET]; TAKE 1 TABLET (50 MG TOTAL) BY MOUTH DAILY.  Dispense: 90 tablet; Refill: 3    If protocol passes may refill for 12 months if within 3 months of last provider visit (or a total of 15 months).             Passed - Blood pressure on file in past 12 months     BP Readings from Last 1 Encounters:   04/03/19 110/74

## 2021-06-05 NOTE — TELEPHONE ENCOUNTER
Left message for patient to call back. If patient calls back, please relay message below.    Patient need office follow up. Refill completed for 90 days.    Routing comment

## 2021-06-07 NOTE — TELEPHONE ENCOUNTER
RN cannot approve Refill Request    RN can NOT refill this medication PCP messaged that patient is overdue for Office Visit. Last office visit: 2/7/2018 Dang Don FNP Last Physical: 4/3/2019 Last MTM visit: Visit date not found Last visit same specialty: 2/7/2018 Dang Don FNP.  Next visit within 3 mo: Visit date not found  Next physical within 3 mo: Visit date not found      Lianna Newsome, [facility] Triage/Med Refill 4/23/2020    Requested Prescriptions   Pending Prescriptions Disp Refills     escitalopram oxalate (LEXAPRO) 20 MG tablet [Pharmacy Med Name: ESCITALOPRAM 20 MG TABLET] 45 tablet 7     Sig: TAKE 1/2 TABLET (10 MG TOTAL) BY MOUTH DAILY       [facility] Refill Protocol  Failed - 4/23/2020 12:12 AM        Failed - PCP or prescribing provider visit in last year     Last office visit with prescriber/PCP: 2/7/2018 Dang Don FNP OR same dept: Visit date not found OR same specialty: 2/7/2018 Dang Don FNP  Last physical: 4/3/2019 Last MTM visit: Visit date not found   Next visit within 3 mo: Visit date not found  Next physical within 3 mo: Visit date not found  Prescriber OR PCP: ZULEIKA Franklin  Last diagnosis associated with med order: 1. Depressive disorder  - escitalopram oxalate (LEXAPRO) 20 MG tablet [Pharmacy Med Name: ESCITALOPRAM 20 MG TABLET]; TAKE 1/2 TABLET (10 MG TOTAL) BY MOUTH DAILY  Dispense: 45 tablet; Refill: 7    If protocol passes may refill for 12 months if within 3 months of last provider visit (or a total of 15 months).

## 2021-06-07 NOTE — TELEPHONE ENCOUNTER
Refill Approved    Rx renewed per Medication Renewal Policy. Medication was last renewed on 10/7/19.    Yvette Palacios, [facility] Triage/Med Refill 3/26/2020     Requested Prescriptions   Pending Prescriptions Disp Refills     metoprolol succinate (TOPROL-XL) 100 MG 24 hr tablet [Pharmacy Med Name: METOPROLOL SUCC  MG TAB] 135 tablet 1     Sig: TAKE 1.5 TABLETS (150 MG TOTAL) BY MOUTH DAILY.       Beta-Blockers Refill Protocol Passed - 3/26/2020  3:42 AM        Passed - PCP or prescribing provider visit in past 12 months or next 3 months     Last office visit with prescriber/PCP: 2/7/2018 Dang Don FNP OR same dept: Visit date not found OR same specialty: 2/7/2018 Dang Don FNP  Last physical: 4/3/2019 Last MTM visit: Visit date not found   Next visit within 3 mo: Visit date not found  Next physical within 3 mo: Visit date not found  Prescriber OR PCP: ZULEIKA Franklin  Last diagnosis associated with med order: 1. HTN (hypertension)  - metoprolol succinate (TOPROL-XL) 100 MG 24 hr tablet [Pharmacy Med Name: METOPROLOL SUCC  MG TAB]; TAKE 1.5 TABLETS (150 MG TOTAL) BY MOUTH DAILY.  Dispense: 135 tablet; Refill: 1    If protocol passes may refill for 12 months if within 3 months of last provider visit (or a total of 15 months).             Passed - Blood pressure filed in past 12 months     BP Readings from Last 1 Encounters:   04/03/19 110/74

## 2021-06-08 NOTE — TELEPHONE ENCOUNTER
RN cannot approve Refill Request    RN can NOT refill this medication PCP messaged that patient is overdue for Labs and Office Visit. Last office visit: Visit date not found Last Physical: Visit date not found Last MTM visit: Visit date not found Last visit same specialty: 2/7/2018 Dang Don FNP.  Next visit within 3 mo: Visit date not found  Next physical within 3 mo: Visit date not found      Krista ISAAC Mascorro, [facility] Triage/Med Refill 6/10/2020    Requested Prescriptions   Pending Prescriptions Disp Refills     losartan (COZAAR) 100 MG tablet [Pharmacy Med Name: LOSARTAN POTASSIUM 100 MG TAB] 30 tablet 2     Sig: TAKE 1 TABLET BY MOUTH EVERY DAY       Angiotensin Receptor Blocker Protocol Failed - 6/9/2020  3:10 PM        Failed - PCP or prescribing provider visit in past 12 months       Last office visit with prescriber/PCP: Visit date not found OR same dept: Visit date not found OR same specialty: 2/7/2018 Dang Don FNP  Last physical: Visit date not found Last MTM visit: Visit date not found   Next visit within 3 mo: Visit date not found  Next physical within 3 mo: Visit date not found  Prescriber OR PCP: Mattie Viveros NP  Last diagnosis associated with med order: 1. Hypertension, unspecified type  - losartan (COZAAR) 100 MG tablet [Pharmacy Med Name: LOSARTAN POTASSIUM 100 MG TAB]; TAKE 1 TABLET BY MOUTH EVERY DAY  Dispense: 30 tablet; Refill: 2  - hydroCHLOROthiazide (HYDRODIURIL) 25 MG tablet [Pharmacy Med Name: HYDROCHLOROTHIAZIDE 25 MG TAB]; TAKE 1 TABLET BY MOUTH EVERY DAY  Dispense: 30 tablet; Refill: 2    2. Hypertension  - spironolactone (ALDACTONE) 50 MG tablet; Take 1 tablet (50 mg total) by mouth daily.  Dispense: 30 tablet; Refill: 0    If protocol passes may refill for 12 months if within 3 months of last provider visit (or a total of 15 months).             Failed - Serum potassium within the past 12 months     No results found for: LN-POTASSIUM           Failed - Blood pressure filed in past 12 months     BP Readings from Last 1 Encounters:   04/03/19 110/74             Failed - Serum creatinine within the past 12 months     Creatinine   Date Value Ref Range Status   12/15/2017 0.79 0.60 - 1.10 mg/dL Final                hydroCHLOROthiazide (HYDRODIURIL) 25 MG tablet [Pharmacy Med Name: HYDROCHLOROTHIAZIDE 25 MG TAB] 30 tablet 2     Sig: TAKE 1 TABLET BY MOUTH EVERY DAY       Diuretics/Combination Diuretics Refill Protocol  Failed - 6/9/2020  3:10 PM        Failed - Visit with PCP or prescribing provider visit in past 12 months     Last office visit with prescriber/PCP: Visit date not found OR same dept: Visit date not found OR same specialty: 2/7/2018 Dang Don, [facility]  Last physical: Visit date not found Last MTM visit: Visit date not found   Next visit within 3 mo: Visit date not found  Next physical within 3 mo: Visit date not found  Prescriber OR PCP: Mattie Viveros NP  Last diagnosis associated with med order: 1. Hypertension, unspecified type  - losartan (COZAAR) 100 MG tablet [Pharmacy Med Name: LOSARTAN POTASSIUM 100 MG TAB]; TAKE 1 TABLET BY MOUTH EVERY DAY  Dispense: 30 tablet; Refill: 2  - hydroCHLOROthiazide (HYDRODIURIL) 25 MG tablet [Pharmacy Med Name: HYDROCHLOROTHIAZIDE 25 MG TAB]; TAKE 1 TABLET BY MOUTH EVERY DAY  Dispense: 30 tablet; Refill: 2    2. Hypertension  - spironolactone (ALDACTONE) 50 MG tablet; Take 1 tablet (50 mg total) by mouth daily.  Dispense: 30 tablet; Refill: 0    If protocol passes may refill for 12 months if within 3 months of last provider visit (or a total of 15 months).             Failed - Serum Potassium in past 12 months      No results found for: LN-POTASSIUM          Failed - Serum Sodium in past 12 months      No results found for: LN-SODIUM          Failed - Blood pressure on file in past 12 months     BP Readings from Last 1 Encounters:   04/03/19 110/74             Failed - Serum Creatinine in past  12 months      Creatinine   Date Value Ref Range Status   12/15/2017 0.79 0.60 - 1.10 mg/dL Final                spironolactone (ALDACTONE) 50 MG tablet 30 tablet 0     Sig: Take 1 tablet (50 mg total) by mouth daily.       Diuretics/Combination Diuretics Refill Protocol  Failed - 6/9/2020  3:10 PM        Failed - Visit with PCP or prescribing provider visit in past 12 months     Last office visit with prescriber/PCP: Visit date not found OR same dept: Visit date not found OR same specialty: 2/7/2018 Dang Don, FNP  Last physical: Visit date not found Last MTM visit: Visit date not found   Next visit within 3 mo: Visit date not found  Next physical within 3 mo: Visit date not found  Prescriber OR PCP: Mattie Viveros NP  Last diagnosis associated with med order: 1. Hypertension, unspecified type  - losartan (COZAAR) 100 MG tablet [Pharmacy Med Name: LOSARTAN POTASSIUM 100 MG TAB]; TAKE 1 TABLET BY MOUTH EVERY DAY  Dispense: 30 tablet; Refill: 2  - hydroCHLOROthiazide (HYDRODIURIL) 25 MG tablet [Pharmacy Med Name: HYDROCHLOROTHIAZIDE 25 MG TAB]; TAKE 1 TABLET BY MOUTH EVERY DAY  Dispense: 30 tablet; Refill: 2    2. Hypertension  - spironolactone (ALDACTONE) 50 MG tablet; Take 1 tablet (50 mg total) by mouth daily.  Dispense: 30 tablet; Refill: 0    If protocol passes may refill for 12 months if within 3 months of last provider visit (or a total of 15 months).             Failed - Serum Potassium in past 12 months      No results found for: LN-POTASSIUM          Failed - Serum Sodium in past 12 months      No results found for: LN-SODIUM          Failed - Blood pressure on file in past 12 months     BP Readings from Last 1 Encounters:   04/03/19 110/74             Failed - Serum Creatinine in past 12 months      Creatinine   Date Value Ref Range Status   12/15/2017 0.79 0.60 - 1.10 mg/dL Final

## 2021-06-08 NOTE — TELEPHONE ENCOUNTER
Left message to call back for: Monae  Information to relay to patient:  Please inform patient that Dang Don FNP refilled her spirolactone.  If any additional refills are need patient will need a virtual appointment with the provider.  Please assist in setting that up.

## 2021-06-08 NOTE — TELEPHONE ENCOUNTER
RN cannot approve Refill Request    RN can NOT refill this medication PCP messaged that patient is overdue for Labs and Office Visit. Last office visit: 2/7/2018 Dang Don FNP Last Physical: 4/3/2019 Last MTM visit: Visit date not found Last visit same specialty: 2/7/2018 Dang Don FNP.  Next visit within 3 mo: Visit date not found  Next physical within 3 mo: Visit date not found      Stephanie Law, [facility] Triage/Med Refill 5/10/2020    Requested Prescriptions   Pending Prescriptions Disp Refills     spironolactone (ALDACTONE) 50 MG tablet [Pharmacy Med Name: SPIRONOLACTONE 50 MG TABLET] 30 tablet 2     Sig: TAKE 1 TABLET (50 MG TOTAL) BY MOUTH DAILY.       Diuretics/Combination Diuretics Refill Protocol  Failed - 5/9/2020  9:09 AM        Failed - Visit with PCP or prescribing provider visit in past 12 months     Last office visit with prescriber/PCP: 2/7/2018 Dang Don FNP OR same dept: Visit date not found OR same specialty: 2/7/2018 Dang Don FNP  Last physical: 4/3/2019 Last MTM visit: Visit date not found   Next visit within 3 mo: Visit date not found  Next physical within 3 mo: Visit date not found  Prescriber OR PCP: ZULEIKA Franklin  Last diagnosis associated with med order: 1. Hypertension  - spironolactone (ALDACTONE) 50 MG tablet [Pharmacy Med Name: SPIRONOLACTONE 50 MG TABLET]; TAKE 1 TABLET (50 MG TOTAL) BY MOUTH DAILY.  Dispense: 30 tablet; Refill: 2    If protocol passes may refill for 12 months if within 3 months of last provider visit (or a total of 15 months).             Failed - Serum Potassium in past 12 months      No results found for: LN-POTASSIUM          Failed - Serum Sodium in past 12 months      No results found for: LN-SODIUM          Failed - Blood pressure on file in past 12 months     BP Readings from Last 1 Encounters:   04/03/19 110/74             Failed - Serum Creatinine in past 12 months      Creatinine   Date Value Ref  Range Status   12/15/2017 0.79 0.60 - 1.10 mg/dL Final

## 2021-06-08 NOTE — TELEPHONE ENCOUNTER
RN cannot approve Refill Request    RN can NOT refill this medication Protocol failed and NO refill given.    Yvette Palacios, [facility] Triage/Med Refill 6/16/2020    Requested Prescriptions   Pending Prescriptions Disp Refills     metoprolol succinate (TOPROL-XL) 100 MG 24 hr tablet 135 tablet 3     Sig: Take 1.5 tablets (150 mg total) by mouth daily.       Beta-Blockers Refill Protocol Failed - 6/16/2020  1:07 PM        Failed - PCP or prescribing provider visit in past 12 months or next 3 months     Last office visit with prescriber/PCP: 2/7/2018 Dang Don FNP OR same dept: Visit date not found OR same specialty: 2/7/2018 Dang Don FNP  Last physical: 4/3/2019 Last MTM visit: Visit date not found   Next visit within 3 mo: Visit date not found  Next physical within 3 mo: Visit date not found  Prescriber OR PCP: ZULEIKA Franklin  Last diagnosis associated with med order: 1. HTN (hypertension)  - metoprolol succinate (TOPROL-XL) 100 MG 24 hr tablet; Take 1.5 tablets (150 mg total) by mouth daily.  Dispense: 135 tablet; Refill: 0    If protocol passes may refill for 12 months if within 3 months of last provider visit (or a total of 15 months).             Failed - Blood pressure filed in past 12 months     BP Readings from Last 1 Encounters:   04/03/19 110/74

## 2021-06-14 NOTE — PROGRESS NOTES
ASSESSMENT:  1. Hypertension  Suboptimal control, in part due to increased psychosocial stress and suboptimal physical activity.  - losartan-hydrochlorothiazide (HYZAAR) 100-25 mg per tablet; TAKE ONE TABLET BY MOUTH ONE TIME DAILY  Dispense: 90 tablet; Refill: 0  - Comprehensive Metabolic Panel  - Lipid Cascade     2. Screen for colon cancer     - Ambulatory referral for Colonoscopy        PLAN:  1.  Renew the losartan hydrochlorothiazide.  2.  Discussed options of increasing the metoprolol versus seem to the patient could lower her blood pressure through lifestyle changes, for now no other change to her blood pressure medications.  3.  Referral for colonoscopy.  4.  She will continue to check her blood pressure regularly and periodically, I do advise she follow up with her primary if she continues to have elevated values.  4.  Laboratory studies also as above.    Orders Placed This Encounter   Procedures     Comprehensive Metabolic Panel     Lipid Cascade     Order Specific Question:   Fasting is required?     Answer:   No     Ambulatory referral for Colonoscopy     Referral Priority:   Routine     Referral Type:   Colonoscopy     Referral Reason:   Evaluation and Treatment     Requested Specialty:   Gastroenterology     Number of Visits Requested:   1     Medications Discontinued During This Encounter   Medication Reason     losartan-hydrochlorothiazide (HYZAAR) 100-25 mg per tablet Reorder       No Follow-up on file.    CHIEF COMPLAINT:  Chief Complaint   Patient presents with     Follow-up     med check       SUBJECTIVE:  Monae is a 52 y.o. female presenting to the clinic today for a medication check.    Hypertension: She has been on spironolactone, metoprolol, and losartan-hydrochlorothiazide to manage her hypertension. Her blood pressure is slightly elevated in the clinic today, but she cites multiple reasons. She has been out of her lisinopril-hydrochlorothiazide for a few days, she has not eaten much as she  was unsure if she would need to have fasting labs today, and she was rushing to the clinic to make sure that she could be seen today. She regularly checks her blood pressures and typically gets values around 130 over high 80's. She notes that the losartan-hydrochlorothiazide makes her dizzy at times, but that it is tolerable. She will be trying to improve her blood pressure through diet and exercise, as well.     Health Maintenance: She has already received the influenza vaccine for the upcoming season.     REVIEW OF SYSTEMS:   All other systems are negative.    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   Immunization History   Administered Date(s) Administered     Influenza, inj, historic,unspecified 10/15/2017     Influenza, seasonal,quad inj 36+ mos 09/27/2016     Td,adult,historic,unspecified 07/13/2009     Tdap 07/13/2009     Social History     Social History     Marital status:      Spouse name: N/A     Number of children: N/A     Years of education: N/A     Occupational History     Not on file.     Social History Main Topics     Smoking status: Never Smoker     Smokeless tobacco: Not on file     Alcohol use No      Comment: once a month     Drug use: No     Sexual activity: Yes     Partners: Male     Birth control/ protection: Surgical     Other Topics Concern     Not on file     Social History Narrative     Past Medical History:   Diagnosis Date     Breast cyst      Depression      Hypertension      Uterine cancer      Family History   Problem Relation Age of Onset     Hypertension Father      Cancer Father      No Medical Problems Sister      No Medical Problems Brother        MEDICATIONS:  Current Outpatient Prescriptions   Medication Sig Dispense Refill     escitalopram oxalate (LEXAPRO) 20 MG tablet Take 1 tablet (20 mg total) by mouth daily. 90 tablet 0     losartan-hydrochlorothiazide (HYZAAR) 100-25 mg per tablet TAKE ONE TABLET BY MOUTH ONE TIME DAILY 90 tablet 0     metoprolol  succinate (TOPROL-XL) 100 MG 24 hr tablet TAKE 1 & 1/2 TABLETS BY MOUTH EVERY DAY 3 tablet 0     spironolactone (ALDACTONE) 50 MG tablet Take 1 tablet (50 mg total) by mouth daily. 30 tablet 0     No current facility-administered medications for this visit.        TOBACCO USE:  History   Smoking Status     Never Smoker   Smokeless Tobacco     Not on file       VITALS:  Vitals:    12/15/17 1544 12/15/17 1602   BP: (!) 146/100 122/90   Patient Site: Right Arm    Patient Position: Sitting    Cuff Size: Adult Large    Pulse: 76    Weight: (!) 290 lb 4.8 oz (131.7 kg)      Wt Readings from Last 3 Encounters:   12/15/17 (!) 290 lb 4.8 oz (131.7 kg)   09/27/16 (!) 281 lb 9.6 oz (127.7 kg)   12/02/15 (!) 268 lb (121.6 kg)       PHYSICAL EXAM:  Constitutional:   Reveals a pleasant female that appears stated age.  Vitals: per nursing notes.  Neuro:  Alert and oriented. Cranial nerves, motor, sensory exams are intact.  No gross focal deficits.  Psychiatric:  Memory intact, mood appropriate.    DATA REVIEWED:  Additional History from Old Records Summarized (2): None.  Decision to Obtain Records (1): None.  Radiology Tests Summarized or Ordered (1): None.  Labs Reviewed or Ordered (1): Ordered labs; lipid cascade, comprehensive metabolic panel.   Medicine Test Summarized or Ordered (1): None.  Independent Review of EKG, X-RAY, or RAPID STREP (2 each): None.     The visit lasted a total of 9 minutes face to face with the patient. Over 50% of the time was spent counseling and educating the patient about hypertension.    Neil PALMA, am scribing for and in the presence of, Dr. Rodriguez.    IDr. Rodriguez, personally performed the services described in this documentation, as scribed by Neil Ariza in my presence, and it is both accurate and complete.    Total data points: 1

## 2021-06-15 NOTE — PROGRESS NOTES
"Assessment:   1. Sinus infection  - fluticasone (FLONASE) 50 mcg/actuation nasal spray; 1 spray into each nostril daily.  Dispense: 16 g; Refill: 0  - azithromycin (ZITHROMAX Z-SILVIA) 250 MG tablet; Take 2 tablets (500 mg) on  Day 1,  followed by 1 tablet (250 mg) once daily on Days 2 through 5.  Dispense: 6 tablet; Refill: 0  Antibiotics per medication orders.  Antitussives per medication orders.  Avoid exposure to tobacco smoke and fumes.  Call if shortness of breath worsens, blood in sputum, change in character of cough, development of fever or chills, inability to maintain nutrition and hydration. Avoid exposure to tobacco smoke and fumes.  Trial of antihistamines.  Trial of steroid nasal spray.     Subjective:   Monae Martinez is a 52 y.o. female here for evaluation of a cough. Onset of symptoms was 1 month ago. Symptoms have been unchanged since that time. The cough is harsh and productive of yellow sputum and is aggravated by nothing. Associated symptoms include: change in voice. Patient does not have a history of asthma. Patient does not have a history of environmental allergens. Patient has not traveled recently. Patient does not have a history of smoking. Patient has not had a previous chest x-ray. Patient has not had a PPD done.    The following portions of the patient's history were reviewed and updated as appropriate: allergies, current medications, past family history, past medical history, past social history, past surgical history and problem list.    Review of Systems  A 12 point comprehensive review of systems was negative except as noted.      Objective:   Oxygen saturation 97% on room air  BP (!) 150/110  Pulse 71  Temp 98  F (36.7  C) (Oral)   Ht 5' 3\" (1.6 m)  Wt (!) 284 lb 3.2 oz (128.9 kg)  SpO2 97%  BMI 50.34 kg/m2  General appearance: alert, appears stated age and cooperative  Head: Normocephalic, without obvious abnormality, atraumatic, sinuses tender to percussion  Eyes: " conjunctivae/corneas clear. PERRL, EOM's intact. Fundi benign.  Ears: normal TM's and external ear canals both ears  Nose: yellow discharge, moderate congestion, turbinates swollen, inflamed  Throat: lips, mucosa, and tongue normal; teeth and gums normal  Neck: no adenopathy, no carotid bruit, no JVD, supple, symmetrical, trachea midline and thyroid not enlarged, symmetric, no tenderness/mass/nodules  Lungs: clear to auscultation bilaterally  Heart: regular rate and rhythm, S1, S2 normal, no murmur, click, rub or gallop  Pulses: 2+ and symmetric  Skin: Skin color, texture, turgor normal. No rashes or lesions  Lymph nodes: Cervical, supraclavicular, and axillary nodes normal.  Neurologic: Grossly normal

## 2021-06-16 PROBLEM — E66.01 MORBID OBESITY (H): Status: ACTIVE | Noted: 2019-04-03

## 2021-06-16 PROBLEM — R73.03 PREDIABETES: Status: ACTIVE | Noted: 2017-12-18

## 2021-06-19 NOTE — LETTER
Letter by Dang Don FNP at      Author: Dang Don FNP Service: -- Author Type: --    Filed:  Encounter Date: 4/18/2019 Status: (Other)         Monae Martinez  [address] [place] [address]               April 18, 2019    Dear Monae:    Our records indicate that you are due for a mammogram.    In the [place], one in nine women will develop breast cancer during their lifetime. While there is no way to prevent breast cancer, early detection provides the best opportunity for curing it.    For women over the age of 40, the American Cancer Society recommends a yearly clinical breast exam and a yearly mammogram. These practices have saved thousands of lives. We need your help to ensure that you are receiving optimal medical care.    Please make an appointment for a mammogram at your earliest convenience.    Sincerely,        Dang Don FNP

## 2021-06-19 NOTE — LETTER
Letter by Dang Don FNP at      Author: Dang Don FNP Service: -- Author Type: --    Filed:  Encounter Date: 8/9/2019 Status: (Other)         Monae Martinez  [address] [place] [address]             August 9, 2019         Dear Ms. Martinez,    Our records show that you are due for a colonoscopy.  We do want to inform you that there are a couple of other options besides the traditional colonoscopy that we offer.  If you would like to do the traditional colonoscopy, please contact a [facility] near you according to the card enclosed.  If you would like to do one of the other options available to you, please let you primary doctor know and they will get that ordered.  Enclosed is some information on the other options for you to read over.  If you have had any of these done at another facility, please arrange for us to receive those records so we can update your chart.    Please call with questions or contact us using [facility].    Sincerely,        Electronically signed by ZULEIKA Franklin

## 2021-06-19 NOTE — LETTER
Letter by Dang Don FNP at      Author: Dang Don FNP Service: -- Author Type: --    Filed:  Encounter Date: 8/1/2019 Status: (Other)         Monae Martinez  [address] [place] [address]             August 1, 2019         Dear Ms. Martinze,    Our records indicate you are due for mammogram.  Enclosed is information on clinics and phone numbers to help you schedule an appointment.  If you have had a mammogram done outside of [facility], please assist us in obtaining a copy of those records so we can update your records. You can have the records faxed to [phone].      Please call with questions or contact us using [email or web address].    Sincerely,        Electronically signed by ZULEIKA Franklin

## 2021-06-19 NOTE — LETTER
Letter by Dang Don FNP at      Author: Dang Don FNP Service: -- Author Type: --    Filed:  Encounter Date: 5/3/2019 Status: (Other)         Monae Martinez  [address] [place] [address]             May 3, 2019         Dear Ms. Martinez,    I just wanted to send a friendly reminder that you have an order in your chart for a mammogram.  Please call radiology at [phone] and they will help you set  up your screening.    Please call with questions or contact us using [employer].    Sincerely,        Electronically signed by ZULEIKA Franklin

## 2021-06-22 NOTE — TELEPHONE ENCOUNTER
RN cannot approve Refill Request    RN can NOT refill this medication PCP messaged that patient is overdue for Labs. And ov    Yvette Palacios, [facility] Triage/Med Refill 1/6/2019    Requested Prescriptions   Pending Prescriptions Disp Refills     spironolactone (ALDACTONE) 50 MG tablet [Pharmacy Med Name: SPIRONOLACTONE 50 MG TABLET] 90 tablet 3     Sig: TAKE 1 TABLET (50 MG TOTAL) BY MOUTH DAILY.    Diuretics/Combination Diuretics Refill Protocol  Failed - 1/5/2019  8:52 AM       Failed - Serum Potassium in past 12 months     No results found for: LN-POTASSIUM         Failed - Serum Sodium in past 12 months     No results found for: LN-SODIUM         Failed - Serum Creatinine in past 12 months     Creatinine   Date Value Ref Range Status   12/15/2017 0.79 0.60 - 1.10 mg/dL Final            Passed - Visit with PCP or prescribing provider visit in past 12 months    Last office visit with prescriber/PCP: 2/7/2018 Dang Don FNP OR same dept: 2/7/2018 Dang Don FNP OR same specialty: 2/7/2018 Dang Don FNP  Last physical: Visit date not found Last MTM visit: Visit date not found   Next visit within 3 mo: Visit date not found  Next physical within 3 mo: Visit date not found  Prescriber OR PCP: ZULEIKA Franklin  Last diagnosis associated with med order: 1. Hypertension  - spironolactone (ALDACTONE) 50 MG tablet [Pharmacy Med Name: SPIRONOLACTONE 50 MG TABLET]; Take 1 tablet (50 mg total) by mouth daily.  Dispense: 90 tablet; Refill: 3    2. HTN (hypertension)  - metoprolol succinate (TOPROL-XL) 100 MG 24 hr tablet [Pharmacy Med Name: METOPROLOL SUCC  MG TAB]; TAKE 1 & 1/2 TABLETS BY MOUTH EVERY DAY  Dispense: 135 tablet; Refill: 1    If protocol passes may refill for 12 months if within 3 months of last provider visit (or a total of 15 months).            Passed - Blood pressure on file in past 12 months    BP Readings from Last 1 Encounters:   02/07/18 (!) 150/110              metoprolol succinate (TOPROL-XL) 100 MG 24 hr tablet [Pharmacy Med Name: METOPROLOL SUCC  MG TAB] 135 tablet 1     Sig: TAKE 1 & 1/2 TABLETS BY MOUTH EVERY DAY    Beta-Blockers Refill Protocol Passed - 1/5/2019  8:52 AM       Passed - PCP or prescribing provider visit in past 12 months or next 3 months    Last office visit with prescriber/PCP: 2/7/2018 Dang Don FNP OR same dept: 2/7/2018 Dang Don FNP OR same specialty: 2/7/2018 Dang Don FNP  Last physical: Visit date not found Last MTM visit: Visit date not found   Next visit within 3 mo: Visit date not found  Next physical within 3 mo: Visit date not found  Prescriber OR PCP: ZULEIKA Franklin  Last diagnosis associated with med order: 1. Hypertension  - spironolactone (ALDACTONE) 50 MG tablet [Pharmacy Med Name: SPIRONOLACTONE 50 MG TABLET]; Take 1 tablet (50 mg total) by mouth daily.  Dispense: 90 tablet; Refill: 3    2. HTN (hypertension)  - metoprolol succinate (TOPROL-XL) 100 MG 24 hr tablet [Pharmacy Med Name: METOPROLOL SUCC  MG TAB]; TAKE 1 & 1/2 TABLETS BY MOUTH EVERY DAY  Dispense: 135 tablet; Refill: 1    If protocol passes may refill for 12 months if within 3 months of last provider visit (or a total of 15 months).            Passed - Blood pressure filed in past 12 months    BP Readings from Last 1 Encounters:   02/07/18 (!) 150/110

## 2021-06-27 NOTE — PROGRESS NOTES
Progress Notes by Dang Don FNP at 4/3/2019  2:30 PM     Author: Dang Don FNP Service: -- Author Type: Nurse Practitioner    Filed: 4/3/2019  5:01 PM Encounter Date: 4/3/2019 Status: Signed    : Dang Don FNP (Nurse Practitioner)       .  FEMALE PREVENTATIVE EXAM    Assessment and Plan:   1. Annual physical exam  Healthy female exam  - fluticasone propionate (FLONASE) 50 mcg/actuation nasal spray; Apply 1 spray into each nostril daily.  Dispense: 16 g; Refill: 6  - Ambulatory referral to Dermatology  - Lipid Cascade; Future  - Basic Metabolic Panel; Future  - Thyroid Stimulating Hormone (TSH); Future  - Hemoglobin; Future    2. Skin lesion  - Ambulatory referral to Dermatology    3. Depressive disorder  Continue current medication.   - escitalopram oxalate (LEXAPRO) 20 MG tablet; Take 0.5 tablets (10 mg total) by mouth daily.  Dispense: 90 tablet; Refill: 3    4. Visit for screening mammogram  - Mammo Screening Bilateral; Future    5. Screen for colon cancer  - Ambulatory referral for Colonoscopy    6. Morbid obesity (H)  The following high BMI interventions were performed this visit: weight monitoring and dietary management education, guidance, and counseling      Next follow up:  No Follow-up on file.    Immunization Review  Adult Imm Review: No immunizations due today    I discussed the following with the patient:   Adult Healthy Living: Importance of regular exercise  Healthy nutrition  Getting adequate sleep  Stress management  Use of seat belts  Distracted driving    I have had an Advance Directives discussion with the patient.    Subjective:   Chief Complaint: Monae Martinez is an 53 y.o. female here for a preventative health visit.     HPI:  Patient reports that she is doing well and has no concerns today    Healthy Habits  Are you taking a daily aspirin? No  Do you typically exercising at least 40 min, 3-4 times per week?  Yes  Do you usually eat at least 4 servings of  fruit and vegetables a day, include whole grains and fiber and avoid regularly eating high fat foods? Yes  Have you had an eye exam in the past two years? Yes  Do you see a dentist twice per year? Yes  Do you have any concerns regarding sleep? No    Safety Screen  If you own firearms, are they secured in a locked gun cabinet or with trigger locks? The patient does not own any firearms  No Data Recorded    Review of Systems:  Please see above.  The rest of the review of systems are negative for all systems.     Pap History:   No - age 30-65 PAP every 3 years recommended  Cancer Screening       Status Date      PAP SMEAR Overdue 7/4/1996     COLONOSCOPY Overdue 7/4/2016     MAMMOGRAM Overdue 9/28/2017      Done 9/28/2016 MAMMO SCREENING BILATERAL          Patient Care Team:  Dang Don FNP as PCP - General (Nurse Practitioner)        History     Not marked as reviewed during this visit.            Objective:   Vital Signs: There were no vitals taken for this visit.       PHYSICAL EXAM  /74   Pulse 69   Wt (!) 287 lb (130.2 kg)   SpO2 96%   BMI 50.84 kg/m    General appearance: alert, appears stated age and cooperative  Head: Normocephalic, without obvious abnormality, atraumatic  Eyes: conjunctivae/corneas clear. PERRL, EOM's intact. Fundi benign.  Ears: normal TM's and external ear canals both ears  Throat: lips, mucosa, and tongue normal; teeth and gums normal  Neck: no adenopathy, no carotid bruit, no JVD, supple, symmetrical, trachea midline and thyroid not enlarged, symmetric, no tenderness/mass/nodules  Back: symmetric, no curvature. ROM normal. No CVA tenderness.  Lungs: clear to auscultation bilaterally  Heart: regular rate and rhythm, S1, S2 normal, no murmur, click, rub or gallop  Extremities: extremities normal, atraumatic, no cyanosis or edema  Pulses: 2+ and symmetric  Skin: Skin color, texture, turgor normal. No rashes or lesions or nevi - generalized and papular - generalized  Lymph  nodes: Cervical, supraclavicular, and axillary nodes normal.  Neurologic: Grossly normal      The 10-year ASCVD risk score (Lacie DC Jr., et al., 2013) is: 1.2%    Values used to calculate the score:      Age: 53 years      Sex: Female      Is Non- : No      Diabetic: No      Tobacco smoker: No      Systolic Blood Pressure: 110 mmHg      Is BP treated: Yes      HDL Cholesterol: 46 mg/dL      Total Cholesterol: 142 mg/dL         Medication List           Accurate as of 4/3/19  4:59 PM. If you have any questions, ask your nurse or doctor.               CHANGE how you take these medications    escitalopram oxalate 20 MG tablet  Also known as:  LEXAPRO  INSTRUCTIONS:  Take 0.5 tablets (10 mg total) by mouth daily.  What changed:  how much to take  Changed by:  ZULEIKA Franklin        metoprolol succinate 100 MG 24 hr tablet  Also known as:  TOPROL-XL  INSTRUCTIONS:  TAKE 1 & 1/2 TABLETS BY MOUTH EVERY DAY  What changed:  Another medication with the same name was removed. Continue taking this medication, and follow the directions you see here.  Changed by:  ZULEIKA Franklin           CONTINUE taking these medications    fluticasone propionate 50 mcg/actuation nasal spray  Also known as:  FLONASE  INSTRUCTIONS:  Apply 1 spray into each nostril daily.        losartan-hydrochlorothiazide 100-25 mg per tablet  Also known as:  HYZAAR  INSTRUCTIONS:  TAKE 1 TABLET BY MOUTH EVERY DAY        spironolactone 50 MG tablet  Also known as:  ALDACTONE  INSTRUCTIONS:  TAKE 1 TABLET (50 MG TOTAL) BY MOUTH DAILY.              Where to Get Your Medications      These medications were sent to [place] [id number] [place] [address] [facility] [address] [address]    Phone:  [phone]     escitalopram oxalate 20 MG tablet    fluticasone propionate 50 mcg/actuation nasal spray         Additional Screenings Completed Today:

## 2021-07-03 NOTE — ADDENDUM NOTE
Addendum Note by Sharmila Wakefield LPN at 10/23/2017 12:31 PM     Author: Sharmila Wakefield LPN Service: -- Author Type: Licensed Nurse    Filed: 10/23/2017 12:31 PM Encounter Date: 10/22/2017 Status: Signed    : Sharmila Wakefield LPN (Licensed Nurse)    Addended by: SHARMILA WAKEFIELD on: 10/23/2017 12:31 PM        Modules accepted: Orders

## 2021-07-23 DIAGNOSIS — F32.A DEPRESSIVE DISORDER: ICD-10-CM

## 2021-07-23 DIAGNOSIS — I10 HYPERTENSION, UNSPECIFIED TYPE: ICD-10-CM

## 2021-07-23 RX ORDER — ESCITALOPRAM OXALATE 10 MG/1
10 TABLET ORAL DAILY
Qty: 30 TABLET | Refills: 0 | Status: SHIPPED | OUTPATIENT
Start: 2021-07-23 | End: 2021-07-27

## 2021-07-23 RX ORDER — HYDROCHLOROTHIAZIDE 25 MG/1
25 TABLET ORAL DAILY
Qty: 30 TABLET | Refills: 0 | Status: SHIPPED | OUTPATIENT
Start: 2021-07-23 | End: 2021-07-27

## 2021-07-23 RX ORDER — LOSARTAN POTASSIUM 100 MG/1
100 TABLET ORAL DAILY
Qty: 30 TABLET | Refills: 0 | Status: SHIPPED | OUTPATIENT
Start: 2021-07-23 | End: 2021-07-27

## 2021-07-23 RX ORDER — METOPROLOL SUCCINATE 100 MG/1
150 TABLET, EXTENDED RELEASE ORAL DAILY
Qty: 45 TABLET | Refills: 0 | Status: SHIPPED | OUTPATIENT
Start: 2021-07-23 | End: 2021-07-27

## 2021-07-23 RX ORDER — SPIRONOLACTONE 50 MG/1
50 TABLET, FILM COATED ORAL DAILY
Qty: 30 TABLET | Refills: 0 | Status: SHIPPED | OUTPATIENT
Start: 2021-07-23 | End: 2021-07-27

## 2021-07-23 NOTE — TELEPHONE ENCOUNTER
Reason for Call: Medication refill     Do you use a [facility]?  Name of the pharmacy and phone number for the current request:  [id number] - [place], [place] - [address] AT [facility] [address]  [phone]    Name of the medication requested:   1.escitalopram oxalate (LEXAPRO) 10 MG tablet  2.losartan (COZAAR) 100 MG tablet ()  3.hydroCHLOROthiazide (HYDRODIURIL) 25 MG tablet  4.spironolactone (ALDACTONE) 50 MG tablet  5.metoprolol succinate (TOPROL-XL) 100 MG 24 hr tablet    Other request: Patient is schedule on 21 for follow-up.     Can we leave a detailed message on this number? YES    Phone number patient can be reached at: Home number on file [phone] (home)    Best Time: ANY    Call taken on 2021 at 10:26 AM by LAURIE Escobar

## 2021-07-27 ENCOUNTER — OFFICE VISIT (OUTPATIENT)
Dept: FAMILY MEDICINE | Facility: CLINIC | Age: 56
End: 2021-07-27
Payer: COMMERCIAL

## 2021-07-27 VITALS
OXYGEN SATURATION: 67 % | SYSTOLIC BLOOD PRESSURE: 128 MMHG | DIASTOLIC BLOOD PRESSURE: 78 MMHG | HEART RATE: 52 BPM | WEIGHT: 259.8 LBS | BODY MASS INDEX: 46.02 KG/M2

## 2021-07-27 DIAGNOSIS — F32.A DEPRESSIVE DISORDER: ICD-10-CM

## 2021-07-27 DIAGNOSIS — I10 HYPERTENSION, UNSPECIFIED TYPE: ICD-10-CM

## 2021-07-27 DIAGNOSIS — Z00.00 HEALTH MAINTENANCE EXAMINATION: ICD-10-CM

## 2021-07-27 DIAGNOSIS — Z11.59 NEED FOR HEPATITIS C SCREENING TEST: Primary | ICD-10-CM

## 2021-07-27 DIAGNOSIS — Z23 NEED FOR VACCINATION: ICD-10-CM

## 2021-07-27 LAB
ANION GAP SERPL CALCULATED.3IONS-SCNC: 12 MMOL/L (ref 5–18)
BUN SERPL-MCNC: 16 MG/DL (ref 8–22)
CALCIUM SERPL-MCNC: 10 MG/DL (ref 8.5–10.5)
CHLORIDE BLD-SCNC: 102 MMOL/L (ref 98–107)
CHOLEST SERPL-MCNC: 139 MG/DL
CO2 SERPL-SCNC: 25 MMOL/L (ref 22–31)
CREAT SERPL-MCNC: 0.87 MG/DL (ref 0.6–1.1)
ERYTHROCYTE [DISTWIDTH] IN BLOOD BY AUTOMATED COUNT: 12.2 % (ref 10–15)
FASTING STATUS PATIENT QL REPORTED: YES
GFR SERPL CREATININE-BSD FRML MDRD: 75 ML/MIN/1.73M2
GLUCOSE BLD-MCNC: 127 MG/DL (ref 70–125)
HCT VFR BLD AUTO: 40.8 % (ref 35–47)
HDLC SERPL-MCNC: 48 MG/DL
HGB BLD-MCNC: 14.3 G/DL (ref 11.7–15.7)
HIV 1+2 AB+HIV1 P24 AG SERPL QL IA: NEGATIVE
LDLC SERPL CALC-MCNC: 68 MG/DL
MCH RBC QN AUTO: 32.1 PG (ref 26.5–33)
MCHC RBC AUTO-ENTMCNC: 35 G/DL (ref 31.5–36.5)
MCV RBC AUTO: 92 FL (ref 78–100)
PLATELET # BLD AUTO: 192 10E3/UL (ref 150–450)
POTASSIUM BLD-SCNC: 4.2 MMOL/L (ref 3.5–5)
RBC # BLD AUTO: 4.46 10E6/UL (ref 3.8–5.2)
SODIUM SERPL-SCNC: 139 MMOL/L (ref 136–145)
TRIGL SERPL-MCNC: 115 MG/DL
TSH SERPL DL<=0.005 MIU/L-ACNC: 2.29 UIU/ML (ref 0.3–5)
WBC # BLD AUTO: 5 10E3/UL (ref 4–11)

## 2021-07-27 PROCEDURE — 84443 ASSAY THYROID STIM HORMONE: CPT | Performed by: NURSE PRACTITIONER

## 2021-07-27 PROCEDURE — 99214 OFFICE O/P EST MOD 30 MIN: CPT | Mod: 25 | Performed by: NURSE PRACTITIONER

## 2021-07-27 PROCEDURE — 36415 COLL VENOUS BLD VENIPUNCTURE: CPT | Performed by: NURSE PRACTITIONER

## 2021-07-27 PROCEDURE — 86803 HEPATITIS C AB TEST: CPT | Performed by: NURSE PRACTITIONER

## 2021-07-27 PROCEDURE — 80048 BASIC METABOLIC PNL TOTAL CA: CPT | Performed by: NURSE PRACTITIONER

## 2021-07-27 PROCEDURE — 80061 LIPID PANEL: CPT | Performed by: NURSE PRACTITIONER

## 2021-07-27 PROCEDURE — 90472 IMMUNIZATION ADMIN EACH ADD: CPT | Performed by: NURSE PRACTITIONER

## 2021-07-27 PROCEDURE — 90750 HZV VACC RECOMBINANT IM: CPT | Performed by: NURSE PRACTITIONER

## 2021-07-27 PROCEDURE — 90471 IMMUNIZATION ADMIN: CPT | Performed by: NURSE PRACTITIONER

## 2021-07-27 PROCEDURE — 87389 HIV-1 AG W/HIV-1&-2 AB AG IA: CPT | Performed by: NURSE PRACTITIONER

## 2021-07-27 PROCEDURE — 90715 TDAP VACCINE 7 YRS/> IM: CPT | Performed by: NURSE PRACTITIONER

## 2021-07-27 PROCEDURE — 85027 COMPLETE CBC AUTOMATED: CPT | Performed by: NURSE PRACTITIONER

## 2021-07-27 RX ORDER — LOSARTAN POTASSIUM 100 MG/1
100 TABLET ORAL DAILY
Qty: 30 TABLET | Refills: 0 | Status: SHIPPED | OUTPATIENT
Start: 2021-07-27 | End: 2021-10-04

## 2021-07-27 RX ORDER — SPIRONOLACTONE 50 MG/1
50 TABLET, FILM COATED ORAL DAILY
Qty: 30 TABLET | Refills: 0 | Status: SHIPPED | OUTPATIENT
Start: 2021-07-27 | End: 2021-10-04

## 2021-07-27 RX ORDER — HYDROCHLOROTHIAZIDE 25 MG/1
25 TABLET ORAL DAILY
Qty: 30 TABLET | Refills: 0 | Status: SHIPPED | OUTPATIENT
Start: 2021-07-27 | End: 2021-10-04

## 2021-07-27 RX ORDER — ESCITALOPRAM OXALATE 10 MG/1
10 TABLET ORAL DAILY
Qty: 30 TABLET | Refills: 0 | Status: SHIPPED | OUTPATIENT
Start: 2021-07-27 | End: 2021-10-04

## 2021-07-27 RX ORDER — METOPROLOL SUCCINATE 100 MG/1
150 TABLET, EXTENDED RELEASE ORAL DAILY
Qty: 45 TABLET | Refills: 0 | Status: SHIPPED | OUTPATIENT
Start: 2021-07-27 | End: 2021-10-04

## 2021-07-27 ASSESSMENT — PATIENT HEALTH QUESTIONNAIRE - PHQ9
SUM OF ALL RESPONSES TO PHQ QUESTIONS 1-9: 1
5. POOR APPETITE OR OVEREATING: NOT AT ALL

## 2021-07-27 ASSESSMENT — ANXIETY QUESTIONNAIRES
5. BEING SO RESTLESS THAT IT IS HARD TO SIT STILL: NOT AT ALL
7. FEELING AFRAID AS IF SOMETHING AWFUL MIGHT HAPPEN: NOT AT ALL
3. WORRYING TOO MUCH ABOUT DIFFERENT THINGS: NOT AT ALL
6. BECOMING EASILY ANNOYED OR IRRITABLE: NOT AT ALL
2. NOT BEING ABLE TO STOP OR CONTROL WORRYING: SEVERAL DAYS
GAD7 TOTAL SCORE: 2
IF YOU CHECKED OFF ANY PROBLEMS ON THIS QUESTIONNAIRE, HOW DIFFICULT HAVE THESE PROBLEMS MADE IT FOR YOU TO DO YOUR WORK, TAKE CARE OF THINGS AT HOME, OR GET ALONG WITH OTHER PEOPLE: NOT DIFFICULT AT ALL
1. FEELING NERVOUS, ANXIOUS, OR ON EDGE: SEVERAL DAYS

## 2021-07-27 NOTE — PROGRESS NOTES
SUBJECTIVE:  Monae Martinez is an 56 year old female who presents for evaluation of   hypertension. She indicates that she is feeling well and   denies any symptoms referable to her elevated blood pressure.   Specifically denies chest pain, palpitations, dyspnea, orthopnea,   PND or peripheral edema. Current medication regimen is as listed   below. Patient denies any side effects of medication.  Patient reported that she and her  are  last year and she is happier than she used to be.  She stated her work is going on well.      Current Outpatient Medications   Medication     cyclobenzaprine (FLEXERIL) 5 MG tablet     escitalopram (LEXAPRO) 10 MG tablet     fluticasone propionate (FLONASE) 50 mcg/actuation nasal spray     hydrochlorothiazide (HYDRODIURIL) 25 MG tablet     losartan (COZAAR) 100 MG tablet     metoprolol succinate ER (TOPROL-XL) 100 MG 24 hr tablet     spironolactone (ALDACTONE) 50 MG tablet     No current facility-administered medications for this visit.     Allergies   Allergen Reactions     Lisinopril Unknown     Annotation: cough       Oxycodone-Acetaminophen Unknown       Social History     Tobacco Use     Smoking status: Never Smoker     Smokeless tobacco: Never Used   Substance Use Topics     Alcohol use: No     Comment: Alcoholic Drinks/day: once a month       OBJECTIVE:  /78   Pulse 52   Wt 117.8 kg (259 lb 12.8 oz)   SpO2 (!) 67%   BMI 46.02 kg/m    ASSESSMENT:  1. Hypertension, unspecified type  Blood pressure is meeting goal of less than 140/90 mmHg per JNC 8 guidelines.  - metoprolol succinate ER (TOPROL-XL) 100 MG 24 hr tablet; Take 1.5 tablets (150 mg) by mouth daily  Dispense: 45 tablet; Refill: 0  - losartan (COZAAR) 100 MG tablet; Take 1 tablet (100 mg) by mouth daily  Dispense: 30 tablet; Refill: 0  - hydrochlorothiazide (HYDRODIURIL) 25 MG tablet; Take 1 tablet (25 mg) by mouth daily  Dispense: 30 tablet; Refill: 0  - spironolactone (ALDACTONE) 50 MG tablet;  Take 1 tablet (50 mg) by mouth daily  Dispense: 30 tablet; Refill: 0    2. Depressive disorder  Stable  - escitalopram (LEXAPRO) 10 MG tablet; Take 1 tablet (10 mg) by mouth daily  Dispense: 30 tablet; Refill: 0    3. Need for hepatitis C screening test  Completed  - Hepatitis C Screen Reflex to HCV RNA Quant and Genotype    4. Health maintenance examination  Completed  - HIV Antigen Antibody Combo    5. Need for vaccination  Comleted  - ZOSTER VACCINE RECOMBINANT ADJUVANTED IM NJX  - TDAP VACCINE (Adacel, Boostrix)  [8160080]      Plan:  1)  Medication: continue current medication regimen unchanged  2)  Dietary sodium restriction  3)  Regular aerobic exercise  4)  Recheck in 6 months, sooner should new symptoms or   problems arise.    Patient Education: Reviewed risks of hypertension and principles of   treatment.

## 2021-07-28 LAB — HCV AB SERPL QL IA: NONREACTIVE

## 2021-07-28 ASSESSMENT — ANXIETY QUESTIONNAIRES: GAD7 TOTAL SCORE: 2

## 2021-08-21 ENCOUNTER — HEALTH MAINTENANCE LETTER (OUTPATIENT)
Age: 56
End: 2021-08-21

## 2021-10-04 DIAGNOSIS — M79.10 MUSCLE ACHE: ICD-10-CM

## 2021-10-04 DIAGNOSIS — I10 HYPERTENSION, UNSPECIFIED TYPE: ICD-10-CM

## 2021-10-04 DIAGNOSIS — F32.A DEPRESSIVE DISORDER: ICD-10-CM

## 2021-10-04 RX ORDER — SPIRONOLACTONE 50 MG/1
50 TABLET, FILM COATED ORAL DAILY
Qty: 30 TABLET | Refills: 0 | Status: SHIPPED | OUTPATIENT
Start: 2021-10-04 | End: 2021-12-22

## 2021-10-04 RX ORDER — LOSARTAN POTASSIUM 100 MG/1
100 TABLET ORAL DAILY
Qty: 90 TABLET | Refills: 1 | Status: SHIPPED | OUTPATIENT
Start: 2021-10-04 | End: 2022-02-15

## 2021-10-04 RX ORDER — METOPROLOL SUCCINATE 100 MG/1
150 TABLET, EXTENDED RELEASE ORAL DAILY
Qty: 45 TABLET | Refills: 0 | Status: SHIPPED | OUTPATIENT
Start: 2021-10-04 | End: 2021-12-22

## 2021-10-04 RX ORDER — ESCITALOPRAM OXALATE 10 MG/1
10 TABLET ORAL DAILY
Qty: 90 TABLET | Refills: 3 | Status: SHIPPED | OUTPATIENT
Start: 2021-10-04 | End: 2022-11-10

## 2021-10-04 RX ORDER — CYCLOBENZAPRINE HCL 5 MG
5 TABLET ORAL EVERY 8 HOURS PRN
Qty: 90 TABLET | Refills: 1 | Status: SHIPPED | OUTPATIENT
Start: 2021-10-04 | End: 2024-03-05

## 2021-10-04 NOTE — TELEPHONE ENCOUNTER
KATHY for Pt informing her that the LEXAPRO was sent over to the pharmacy and will pend the rest of the medications for PCP

## 2021-10-04 NOTE — TELEPHONE ENCOUNTER
"Patient calling for refill, currently at pharmacy. She has been out of Lexapro for 5-6 days.    Routing refill request to provider for review/approval because:  Patient needs to be seen because it has been more than 1 year since last office visit.    Last Written Prescription Date:  7/27/21  Last Fill Quantity: 30,  # refills: 0   Last office visit provider:  6/17/20     Requested Prescriptions   Pending Prescriptions Disp Refills     escitalopram (LEXAPRO) 10 MG tablet 30 tablet 0     Sig: Take 1 tablet (10 mg) by mouth daily       SSRIs Protocol Failed - 10/4/2021  2:45 PM        Failed - Recent (6 mo) or future (30 days) visit within the authorizing provider's specialty     Patient had office visit in the last 6 months or has a visit in the next 30 days with authorizing provider or within the authorizing provider's specialty.  See \"Patient Info\" tab in inbasket, or \"Choose Columns\" in Meds & Orders section of the refill encounter.            Passed - PHQ-9 score less than 5 in past 6 months     Please review last PHQ-9 score.           Passed - Medication is active on med list        Passed - Patient is age 18 or older        Passed - No active pregnancy on record        Passed - No positive pregnancy test in last 12 months             Elidia Bean RN 10/04/21 2:47 PM  "

## 2021-10-04 NOTE — TELEPHONE ENCOUNTER
..Reason for Call:  Medication or medication refill:    Do you use a [facility]?  Name of the pharmacy and phone number for the current request:  [id number] - [place], [place] - [address] AT [facility] [address]  [phone]    Name of the medication requested:   1. escitalopram (LEXAPRO) 10 MG tablet  2. cyclobenzaprine (FLEXERIL) 5 MG tablet  3. hydrochlorothiazide (HYDRODIURIL) 25 MG tablet  4. losartan (COZAAR) 100 MG tablet  5. spironolactone (ALDACTONE) 50 MG tablet  6. fluticasone propionate (FLONASE) 50 mcg/actuation nasal spray  7. nystatin (MYCOSTATIN) powder    Other request: Patient hasn't taken her Lexapro since last Wednesday, September 29th.     Can we leave a detailed message on this number? YES    Phone number patient can be reached at: Home number on file [phone] (home)    Best Time: ANY    Call taken on 10/4/2021 at 10:36 AM by LAURIE Escobar

## 2021-10-04 NOTE — TELEPHONE ENCOUNTER
Patient calling to check on status of medication. She is mostly concerned about her Lexapro as she has been out since last week. Please advise.       Thank you,  ..LAURIE Escobar

## 2021-10-05 RX ORDER — HYDROCHLOROTHIAZIDE 25 MG/1
25 TABLET ORAL DAILY
Qty: 90 TABLET | Refills: 1 | Status: SHIPPED | OUTPATIENT
Start: 2021-10-05 | End: 2022-04-11

## 2021-10-16 ENCOUNTER — HEALTH MAINTENANCE LETTER (OUTPATIENT)
Age: 56
End: 2021-10-16

## 2021-12-17 DIAGNOSIS — I10 HYPERTENSION, UNSPECIFIED TYPE: ICD-10-CM

## 2021-12-17 NOTE — TELEPHONE ENCOUNTER
Reason for Call:  Medication or medication refill: 2 Rx refills    Do you use a [facility]? NO Name of the pharmacy and phone number for the current request: [facility] [address] @ [facility] in [place], [phone]    Name of the medication requested:     metoprolol succinate ER (TOPROL-XL) 100 MG 24 hr tablet 45 tablet 0 10/4/2021  No   Sig - Route: Take 1.5 tablets (150 mg) by mouth daily - Oral   Sent to pharmacy as: Metoprolol Succinate  MG Oral Tablet Extended Release 24 Hour (TOPROL-XL)     spironolactone (ALDACTONE) 50 MG tablet 30 tablet 0 10/4/2021  No   Sig - Route: Take 1 tablet (50 mg) by mouth daily - Oral   Sent to pharmacy as: Spironolactone 50 MG Oral Tablet (ALDACTONE)       Other request: Patient states Dr Don was her primary but she has also seen Dr Rodriguez in the past 12/2017) and she was wondering if she can re-establish care with Dr Rodriguez? Patient is hoping Dr Rodriguez can refill these 2 Rx's for her until she can est care with Dr Rodriguez or another provider.    Can we leave a detailed message on this number? YES    Phone number patient can be reached at: Home number on file [phone] (home)    Best Time: any    Call taken on 12/17/2021 at 4:19 PM by Maria Isabel Hennessy

## 2021-12-20 NOTE — TELEPHONE ENCOUNTER
I will refill these medications please shabbir them up so I can sign, since I am not taking new patients so she will have to establish with another provider.

## 2021-12-21 RX ORDER — METOPROLOL SUCCINATE 100 MG/1
150 TABLET, EXTENDED RELEASE ORAL DAILY
Qty: 45 TABLET | Refills: 1 | OUTPATIENT
Start: 2021-12-21

## 2021-12-21 RX ORDER — SPIRONOLACTONE 50 MG/1
50 TABLET, FILM COATED ORAL DAILY
Qty: 30 TABLET | Refills: 1 | OUTPATIENT
Start: 2021-12-21

## 2021-12-22 DIAGNOSIS — I10 HYPERTENSION, UNSPECIFIED TYPE: ICD-10-CM

## 2021-12-22 RX ORDER — METOPROLOL SUCCINATE 100 MG/1
150 TABLET, EXTENDED RELEASE ORAL DAILY
Qty: 45 TABLET | Refills: 0 | Status: SHIPPED | OUTPATIENT
Start: 2021-12-22 | End: 2021-12-25

## 2021-12-22 RX ORDER — SPIRONOLACTONE 50 MG/1
50 TABLET, FILM COATED ORAL DAILY
Qty: 30 TABLET | Refills: 0 | Status: SHIPPED | OUTPATIENT
Start: 2021-12-22 | End: 2021-12-25

## 2021-12-25 RX ORDER — METOPROLOL SUCCINATE 100 MG/1
TABLET, EXTENDED RELEASE ORAL
Qty: 135 TABLET | Refills: 2 | Status: SHIPPED | OUTPATIENT
Start: 2021-12-25 | End: 2022-11-10

## 2021-12-25 NOTE — TELEPHONE ENCOUNTER
"Metoprolol refilled per RN protocol.  Spironolactone, however requires provider review and auth due to contraindication noted with losartan.  Thank you-    Last Written Metoprolol Date:  12/22/21  Last Fill Quantity: 45,  # refills: 0   (Reason for refill request -> 90 day supply requested.)    Last Spironolactone Date:  12/22/21  Last office visit provider:  7/27/21   (Reason for refill request -> 90 day supply requested.)    Requested Prescriptions   Pending Prescriptions Disp Refills     metoprolol succinate ER (TOPROL-XL) 100 MG 24 hr tablet [Pharmacy Med Name: METOPROLOL ER SUCCINATE 100MG TABS] 135 tablet      Sig: TAKE 1 AND 1/2 TABLETS(150 MG) BY MOUTH DAILY       Beta-Blockers Protocol Passed - 12/22/2021 12:59 PM        Passed - Blood pressure under 140/90 in past 12 months     BP Readings from Last 3 Encounters:   07/27/21 128/78                 Passed - Patient is age 6 or older        Passed - Recent (12 mo) or future (30 days) visit within the authorizing provider's specialty     Patient has had an office visit with the authorizing provider or a provider within the authorizing providers department within the previous 12 mos or has a future within next 30 days. See \"Patient Info\" tab in inbasket, or \"Choose Columns\" in Meds & Orders section of the refill encounter.              Passed - Medication is active on med list           spironolactone (ALDACTONE) 50 MG tablet [Pharmacy Med Name: SPIRONOLACTONE 50MG TABLETS] 90 tablet      Sig: TAKE 1 TABLET(50 MG) BY MOUTH DAILY       Diuretics (Including Combos) Protocol Passed - 12/22/2021 12:59 PM        Passed - Blood pressure under 140/90 in past 12 months     BP Readings from Last 3 Encounters:   07/27/21 128/78                 Passed - Recent (12 mo) or future (30 days) visit within the authorizing provider's specialty     Patient has had an office visit with the authorizing provider or a provider within the authorizing providers department within the " "previous 12 mos or has a future within next 30 days. See \"Patient Info\" tab in inbasket, or \"Choose Columns\" in Meds & Orders section of the refill encounter.              Passed - Medication is active on med list        Passed - Patient is age 18 or older        Passed - No active pregancy on record        Passed - Normal serum creatinine on file in past 12 months     Recent Labs   Lab Test 07/27/21  0848   CR 0.87              Passed - Normal serum potassium on file in past 12 months     Recent Labs   Lab Test 07/27/21  0848   POTASSIUM 4.2                    Passed - Normal serum sodium on file in past 12 months     Recent Labs   Lab Test 07/27/21  0848                 Passed - No positive pregnancy test in past 12 months             Stormy Jenkins RN 12/25/21 8:42 AM  "

## 2021-12-27 RX ORDER — SPIRONOLACTONE 50 MG/1
TABLET, FILM COATED ORAL
Qty: 90 TABLET | Refills: 2 | Status: SHIPPED | OUTPATIENT
Start: 2021-12-27 | End: 2022-10-28

## 2022-02-14 DIAGNOSIS — I10 HYPERTENSION, UNSPECIFIED TYPE: ICD-10-CM

## 2022-02-14 NOTE — TELEPHONE ENCOUNTER
Refill Request  Medication name: Pending Prescriptions:                       Disp   Refills    losartan (COZAAR) 100 MG tablet           90 tab*1            Sig: Take 1 tablet (100 mg) by mouth daily    Who prescribed the medication: Arian  Last refill on medication: 10/21  [facility]ed [facility]  Last appointment with PCP: 7/21  Next appointment: Not due

## 2022-02-15 RX ORDER — LOSARTAN POTASSIUM 100 MG/1
100 TABLET ORAL DAILY
Qty: 90 TABLET | Refills: 1 | Status: SHIPPED | OUTPATIENT
Start: 2022-02-15 | End: 2022-10-27

## 2022-04-10 DIAGNOSIS — I10 HYPERTENSION, UNSPECIFIED TYPE: ICD-10-CM

## 2022-04-11 DIAGNOSIS — I10 HYPERTENSION, UNSPECIFIED TYPE: ICD-10-CM

## 2022-04-11 RX ORDER — HYDROCHLOROTHIAZIDE 25 MG/1
25 TABLET ORAL DAILY
Qty: 90 TABLET | Refills: 1 | Status: SHIPPED | OUTPATIENT
Start: 2022-04-11 | End: 2022-11-10

## 2022-04-12 RX ORDER — METOPROLOL SUCCINATE 100 MG/1
TABLET, EXTENDED RELEASE ORAL
Qty: 135 TABLET | Refills: 2 | OUTPATIENT
Start: 2022-04-12

## 2022-09-25 ENCOUNTER — HEALTH MAINTENANCE LETTER (OUTPATIENT)
Age: 57
End: 2022-09-25

## 2022-10-26 DIAGNOSIS — I10 HYPERTENSION, UNSPECIFIED TYPE: ICD-10-CM

## 2022-10-27 NOTE — TELEPHONE ENCOUNTER
"Routing refill request to provider for review/approval because:  Labs not current:  Multiple  Patient needs to be seen because it has been more than 1 year since last office visit.  BP not current    Last Written Prescription Date:  2/15/22  Last Fill Quantity: 90,  # refills: 1   Last office visit provider:  7/27/22     Requested Prescriptions   Pending Prescriptions Disp Refills     losartan (COZAAR) 100 MG tablet [Pharmacy Med Name: LOSARTAN 100MG TABLETS] 90 tablet 1     Sig: TAKE 1 TABLET(100 MG) BY MOUTH DAILY       Angiotensin-II Receptors Failed - 10/27/2022  1:18 PM        Failed - Last blood pressure under 140/90 in past 12 months     BP Readings from Last 3 Encounters:   07/27/21 128/78                 Failed - Recent (12 mo) or future (30 days) visit within the authorizing provider's specialty     Patient has had an office visit with the authorizing provider or a provider within the authorizing providers department within the previous 12 mos or has a future within next 30 days. See \"Patient Info\" tab in inbasket, or \"Choose Columns\" in Meds & Orders section of the refill encounter.              Failed - Normal serum creatinine on file in past 12 months     Recent Labs   Lab Test 07/27/21  0848   CR 0.87       Ok to refill medication if creatinine is low          Failed - Normal serum potassium on file in past 12 months     Recent Labs   Lab Test 07/27/21  0848   POTASSIUM 4.2                    Passed - Medication is active on med list        Passed - Patient is age 18 or older        Passed - No active pregnancy on record        Passed - No positive pregnancy test in past 12 months             Alan Cline RN 10/27/22 1:19 PM  "

## 2022-10-28 RX ORDER — LOSARTAN POTASSIUM 100 MG/1
100 TABLET ORAL DAILY
Qty: 90 TABLET | Refills: 0 | Status: SHIPPED | OUTPATIENT
Start: 2022-10-28 | End: 2023-01-12

## 2022-11-08 ENCOUNTER — TELEPHONE (OUTPATIENT)
Dept: INTERNAL MEDICINE | Facility: CLINIC | Age: 57
End: 2022-11-08

## 2022-11-08 DIAGNOSIS — I10 HYPERTENSION, UNSPECIFIED TYPE: ICD-10-CM

## 2022-11-08 DIAGNOSIS — F32.A DEPRESSIVE DISORDER: ICD-10-CM

## 2022-11-08 RX ORDER — ESCITALOPRAM OXALATE 10 MG/1
10 TABLET ORAL DAILY
Qty: 90 TABLET | Refills: 3 | Status: CANCELLED | OUTPATIENT
Start: 2022-11-08

## 2022-11-08 NOTE — TELEPHONE ENCOUNTER
Reason for Call:  Other prescription    Detailed comments: refill    Phone Number Patient can be reached at: Cell number on file:    Telephone Information:   Mobile [phone]       Best Time: anytime     Can we leave a detailed message on this number? YES    Call taken on 11/8/2022 at 11:14 AM by Jayashree Flores

## 2022-11-09 NOTE — TELEPHONE ENCOUNTER
Returned phone call to patient. She is in need of medication refills and to establish care with someone here as she is a [facility]. I helped her schedule her physical with Tatum Lynch later in November.    The medications she needs refilled more urgently are     Hydrochlorothiazide  Metoprolol  Escitalopram    Is Cris or a covering provider able to give her a refill to get her by until she can be seen Nov 25th?     Thanks!    Patient can be reached at [phone] if needed.

## 2022-11-10 RX ORDER — ESCITALOPRAM OXALATE 10 MG/1
10 TABLET ORAL DAILY
Qty: 90 TABLET | Refills: 0 | Status: SHIPPED | OUTPATIENT
Start: 2022-11-10 | End: 2023-01-12

## 2022-11-10 RX ORDER — METOPROLOL SUCCINATE 100 MG/1
TABLET, EXTENDED RELEASE ORAL
Qty: 135 TABLET | Refills: 0 | Status: SHIPPED | OUTPATIENT
Start: 2022-11-10 | End: 2023-01-12

## 2022-11-10 RX ORDER — HYDROCHLOROTHIAZIDE 25 MG/1
25 TABLET ORAL DAILY
Qty: 90 TABLET | Refills: 0 | Status: SHIPPED | OUTPATIENT
Start: 2022-11-10 | End: 2023-01-12

## 2023-01-10 ASSESSMENT — ENCOUNTER SYMPTOMS
SORE THROAT: 0
FEVER: 0
SHORTNESS OF BREATH: 0
HEADACHES: 0
DIZZINESS: 0
CONSTIPATION: 0
CHILLS: 0
DIARRHEA: 0
FREQUENCY: 0
ARTHRALGIAS: 0
NERVOUS/ANXIOUS: 1
BREAST MASS: 0
PALPITATIONS: 0
COUGH: 0
DYSURIA: 0
EYE PAIN: 0
PARESTHESIAS: 0
HEMATOCHEZIA: 0
ABDOMINAL PAIN: 0
HEARTBURN: 0
JOINT SWELLING: 0
HEMATURIA: 0
NAUSEA: 0
WEAKNESS: 0
MYALGIAS: 0

## 2023-01-10 ASSESSMENT — ANXIETY QUESTIONNAIRES
7. FEELING AFRAID AS IF SOMETHING AWFUL MIGHT HAPPEN: NOT AT ALL
8. IF YOU CHECKED OFF ANY PROBLEMS, HOW DIFFICULT HAVE THESE MADE IT FOR YOU TO DO YOUR WORK, TAKE CARE OF THINGS AT HOME, OR GET ALONG WITH OTHER PEOPLE?: NOT DIFFICULT AT ALL
4. TROUBLE RELAXING: SEVERAL DAYS
1. FEELING NERVOUS, ANXIOUS, OR ON EDGE: SEVERAL DAYS
5. BEING SO RESTLESS THAT IT IS HARD TO SIT STILL: NOT AT ALL
2. NOT BEING ABLE TO STOP OR CONTROL WORRYING: SEVERAL DAYS
6. BECOMING EASILY ANNOYED OR IRRITABLE: NOT AT ALL
IF YOU CHECKED OFF ANY PROBLEMS ON THIS QUESTIONNAIRE, HOW DIFFICULT HAVE THESE PROBLEMS MADE IT FOR YOU TO DO YOUR WORK, TAKE CARE OF THINGS AT HOME, OR GET ALONG WITH OTHER PEOPLE: NOT DIFFICULT AT ALL
GAD7 TOTAL SCORE: 3
GAD7 TOTAL SCORE: 3
3. WORRYING TOO MUCH ABOUT DIFFERENT THINGS: NOT AT ALL
7. FEELING AFRAID AS IF SOMETHING AWFUL MIGHT HAPPEN: NOT AT ALL

## 2023-01-11 ENCOUNTER — OFFICE VISIT (OUTPATIENT)
Dept: FAMILY MEDICINE | Facility: CLINIC | Age: 58
End: 2023-01-11
Payer: COMMERCIAL

## 2023-01-11 VITALS
DIASTOLIC BLOOD PRESSURE: 70 MMHG | HEART RATE: 60 BPM | WEIGHT: 265 LBS | BODY MASS INDEX: 48.76 KG/M2 | SYSTOLIC BLOOD PRESSURE: 118 MMHG | OXYGEN SATURATION: 97 % | TEMPERATURE: 97.2 F | HEIGHT: 62 IN

## 2023-01-11 DIAGNOSIS — F32.A DEPRESSIVE DISORDER: ICD-10-CM

## 2023-01-11 DIAGNOSIS — L30.4 INTERTRIGO: ICD-10-CM

## 2023-01-11 DIAGNOSIS — Z12.11 SCREEN FOR COLON CANCER: ICD-10-CM

## 2023-01-11 DIAGNOSIS — E55.9 VITAMIN D DEFICIENCY: ICD-10-CM

## 2023-01-11 DIAGNOSIS — Z13.29 SCREENING FOR THYROID DISORDER: ICD-10-CM

## 2023-01-11 DIAGNOSIS — R73.01 ELEVATED FASTING GLUCOSE: ICD-10-CM

## 2023-01-11 DIAGNOSIS — Z13.220 SCREENING FOR LIPID DISORDERS: ICD-10-CM

## 2023-01-11 DIAGNOSIS — Z00.00 ROUTINE GENERAL MEDICAL EXAMINATION AT A HEALTH CARE FACILITY: Primary | ICD-10-CM

## 2023-01-11 DIAGNOSIS — Z12.31 VISIT FOR SCREENING MAMMOGRAM: ICD-10-CM

## 2023-01-11 DIAGNOSIS — E66.01 MORBID OBESITY (H): ICD-10-CM

## 2023-01-11 DIAGNOSIS — I10 HYPERTENSION, UNSPECIFIED TYPE: ICD-10-CM

## 2023-01-11 DIAGNOSIS — L98.9 SKIN LESION: ICD-10-CM

## 2023-01-11 PROCEDURE — 96127 BRIEF EMOTIONAL/BEHAV ASSMT: CPT | Performed by: NURSE PRACTITIONER

## 2023-01-11 PROCEDURE — 99214 OFFICE O/P EST MOD 30 MIN: CPT | Mod: 25 | Performed by: NURSE PRACTITIONER

## 2023-01-11 PROCEDURE — 99396 PREV VISIT EST AGE 40-64: CPT | Performed by: NURSE PRACTITIONER

## 2023-01-11 RX ORDER — NYSTATIN 100000 [USP'U]/G
POWDER TOPICAL 2 TIMES DAILY PRN
Qty: 60 G | Refills: 3 | Status: SHIPPED | OUTPATIENT
Start: 2023-01-11 | End: 2024-03-05

## 2023-01-11 ASSESSMENT — PATIENT HEALTH QUESTIONNAIRE - PHQ9
SUM OF ALL RESPONSES TO PHQ QUESTIONS 1-9: 4
10. IF YOU CHECKED OFF ANY PROBLEMS, HOW DIFFICULT HAVE THESE PROBLEMS MADE IT FOR YOU TO DO YOUR WORK, TAKE CARE OF THINGS AT HOME, OR GET ALONG WITH OTHER PEOPLE: NOT DIFFICULT AT ALL
SUM OF ALL RESPONSES TO PHQ QUESTIONS 1-9: 4

## 2023-01-11 NOTE — PROGRESS NOTES
SUBJECTIVE:   CC: Monae is an 57 year old who presents for preventive health visit.     Patient also wishes to establish care.  Patient is recently  with 1 adult son.  She works as a hospice nurse in several hospitals.    History of hypertension.  She has hydrochlorothiazide, losartan, and metoprolol for this.  She is not regularly checking blood pressures.  Patient is also on spironolactone, but this was started for some hormonal acne versus hypertension.  She does find that it is helpful for acne.    History of anxiety and depression.  She is currently on Lexapro for this and has been on it since the birth of her son.  January has been a bit of a hard month for her.  Her divorce is recently final.  She has also sustained a fair amount of trauma in her life including the death of both of her parents.  She has done therapy in the past, but is not currently established with anyone.  She does do yoga intermittently.  She is interested in establishing with a therapist.    History of uterine cancer.  She has had a hysterectomy.  Her ovaries are intact.  No longer having Pap screenings.    Patient is frustrated with her weight.  She would like to make some changes in looking for resources regarding this.    Requesting a prescription for nystatin powder.  She gets fungal rashes under her breasts and sometimes in the groin area.    Patient has been advised of split billing requirements and indicates understanding: Yes  Healthy Habits:     Getting at least 3 servings of Calcium per day:  Yes    Bi-annual eye exam:  Yes    Dental care twice a year:  Yes    Sleep apnea or symptoms of sleep apnea:  None    Diet:  Regular (no restrictions)    Frequency of exercise:  2-3 days/week    Duration of exercise:  15-30 minutes    Taking medications regularly:  Yes    PHQ-2 Total Score: 1    Additional concerns today:  Yes    Answers for HPI/ROS submitted by the patient on 1/11/2023  If you checked off any problems, how  "difficult have these problems made it for you to do your work, take care of things at home, or get along with other people?: Not difficult at all  PHQ9 TOTAL SCORE: 4  KEYANNA 7 TOTAL SCORE: 3        Today's PHQ-2 Score:   PHQ-2 ( 1999 [employer]) 1/10/2023   Q1: Little interest or pleasure in doing things 0   Q2: Feeling down, depressed or hopeless 0   PHQ-2 Score 0   Q1: Little interest or pleasure in doing things Not at all   Q2: Feeling down, depressed or hopeless Not at all   PHQ-2 Score 0       Social History     Tobacco Use     Smoking status: Never     Smokeless tobacco: Never   Substance Use Topics     Alcohol use: No     Comment: Alcoholic Drinks/day: once a month     If you drink alcohol do you typically have >3 drinks per day or >7 drinks per week? No    No flowsheet data found.    Reviewed orders with patient.  Reviewed health maintenance and updated orders accordingly - Yes      Breast Cancer Screening:    Breast CA Risk Assessment (FHS-7) 1/10/2023   Do you have a family history of breast, colon, or ovarian cancer? No / Unknown       Pertinent mammograms are reviewed under the imaging tab.    History of abnormal Pap smear: Status post benign hysterectomy. Health Maintenance and Surgical History updated.     Reviewed and updated as needed this visit by clinical staff   Tobacco  Allergies               Reviewed and updated as needed this visit by Provider                     Review of Systems  Pertinent items in HPI     OBJECTIVE:   /70 (BP Location: Left arm, Patient Position: Sitting, Cuff Size: Adult Large)   Pulse 60   Temp 97.2  F (36.2  C) (Temporal)   Ht 1.575 m (5' 2\")   Wt 120.2 kg (265 lb)   SpO2 97%   BMI 48.47 kg/m    Physical Exam  GENERAL: healthy, alert and no distress  EYES: Eyes grossly normal to inspection, PERRL and conjunctivae and sclerae normal  HENT: ear canals and TM's normal, nose and mouth without ulcers or lesions  NECK: no adenopathy, no asymmetry, masses, or scars and " thyroid normal to palpation  RESP: lungs clear to auscultation - no rales, rhonchi or wheezes  CV: regular rate and rhythm, normal S1 S2, no S3 or S4, no murmur, click or rub, no peripheral edema and peripheral pulses strong  ABDOMEN: soft, nontender, no hepatosplenomegaly, no masses and bowel sounds normal  MS: no gross musculoskeletal defects noted, no edema  SKIN: no suspicious lesions or rashes  NEURO: Normal strength and tone, mentation intact and speech normal  PSYCH: mentation appears normal, affect normal/bright      ASSESSMENT/PLAN:   Monae was seen today for physical.    Diagnoses and all orders for this visit:    Routine general medical examination at a health care facility    Screen for colon cancer  -     COLOGUARD([employer]); Future    Visit for screening mammogram  -     MA SCREENING DIGITAL BILAT - Future  (s+30); Future    Elevated fasting glucose  -     Comprehensive metabolic panel (BMP + Alb, Alk Phos, ALT, AST, Total. Bili, TP); Future  -     Hemoglobin A1c; Future    Morbid obesity (H)  -     Comprehensive Weight Management; Future    Screening for thyroid disorder  -     TSH with free T4 reflex; Future    Vitamin D deficiency  -     Vitamin D Deficiency; Future    Screening for lipid disorders  -     Lipid panel reflex to direct LDL Fasting; Future    Skin lesion  -     Adult Dermatology Referral; Future    Intertrigo  -     nystatin (MYCOSTATIN) 246549 UNIT/GM external powder; Apply topically 2 times daily as needed for other (rash)    Depressive disorder  Controlled.  I recommend establishing with a therapist.  Patient declines a referral today, as she has some resources.   -     escitalopram (LEXAPRO) 10 MG tablet; Take 1 tablet (10 mg) by mouth daily    Hypertension, unspecified type  Optimal control.  -     hydrochlorothiazide (HYDRODIURIL) 25 MG tablet; Take 1 tablet (25 mg) by mouth daily  -     losartan (COZAAR) 100 MG tablet; Take 1 tablet (100 mg) by mouth daily  -     metoprolol  succinate ER (TOPROL XL) 100 MG 24 hr tablet; TAKE 1 AND 1/2 TABLETS(150 MG) BY MOUTH DAILY Strength: 100 mg  -     spironolactone (ALDACTONE) 50 MG tablet; Take 1 tablet (50 mg) by mouth daily    Other orders  -     REVIEW OF HEALTH MAINTENANCE PROTOCOL ORDERS        Patient has been advised of split billing requirements and indicates understanding: Yes      COUNSELING:  Reviewed preventive health counseling, as reflected in patient instructions        She reports that she has never smoked. She has never used smokeless tobacco.      Mattie Viveros NP  [facility]

## 2023-01-12 RX ORDER — ESCITALOPRAM OXALATE 10 MG/1
10 TABLET ORAL DAILY
Qty: 90 TABLET | Refills: 3 | Status: SHIPPED | OUTPATIENT
Start: 2023-01-12 | End: 2024-02-27

## 2023-01-12 RX ORDER — LOSARTAN POTASSIUM 100 MG/1
100 TABLET ORAL DAILY
Qty: 90 TABLET | Refills: 3 | Status: SHIPPED | OUTPATIENT
Start: 2023-01-12 | End: 2024-02-23

## 2023-01-12 RX ORDER — METOPROLOL SUCCINATE 100 MG/1
TABLET, EXTENDED RELEASE ORAL
Qty: 135 TABLET | Refills: 3 | Status: SHIPPED | OUTPATIENT
Start: 2023-01-12 | End: 2024-06-26

## 2023-01-12 RX ORDER — SPIRONOLACTONE 50 MG/1
50 TABLET, FILM COATED ORAL DAILY
Qty: 90 TABLET | Refills: 3 | Status: SHIPPED | OUTPATIENT
Start: 2023-01-12 | End: 2024-02-23

## 2023-01-12 RX ORDER — HYDROCHLOROTHIAZIDE 25 MG/1
25 TABLET ORAL DAILY
Qty: 90 TABLET | Refills: 3 | Status: SHIPPED | OUTPATIENT
Start: 2023-01-12 | End: 2024-02-27

## 2023-01-17 ENCOUNTER — HOSPITAL ENCOUNTER (OUTPATIENT)
Dept: MAMMOGRAPHY | Facility: CLINIC | Age: 58
Discharge: HOME | End: 2023-01-17
Attending: NURSE PRACTITIONER | Admitting: NURSE PRACTITIONER
Payer: COMMERCIAL

## 2023-01-17 DIAGNOSIS — Z12.31 VISIT FOR SCREENING MAMMOGRAM: ICD-10-CM

## 2023-01-17 PROCEDURE — 77067 SCR MAMMO BI INCL CAD: CPT

## 2023-02-02 LAB — NONINV COLON CA DNA+OCC BLD SCRN STL QL: NEGATIVE

## 2023-02-08 ENCOUNTER — OFFICE VISIT (OUTPATIENT)
Dept: SURGERY | Facility: CLINIC | Age: 58
End: 2023-02-08
Payer: COMMERCIAL

## 2023-02-08 ENCOUNTER — LAB (OUTPATIENT)
Dept: LAB | Facility: CLINIC | Age: 58
End: 2023-02-08
Payer: COMMERCIAL

## 2023-02-08 VITALS
BODY MASS INDEX: 48.21 KG/M2 | HEIGHT: 62 IN | WEIGHT: 262 LBS | SYSTOLIC BLOOD PRESSURE: 130 MMHG | DIASTOLIC BLOOD PRESSURE: 74 MMHG

## 2023-02-08 DIAGNOSIS — E66.01 OBESITY, CLASS III, BMI 40-49.9 (MORBID OBESITY) (H): ICD-10-CM

## 2023-02-08 DIAGNOSIS — E66.01 OBESITY, CLASS III, BMI 40-49.9 (MORBID OBESITY) (H): Primary | ICD-10-CM

## 2023-02-08 DIAGNOSIS — E88.810 METABOLIC SYNDROME X: ICD-10-CM

## 2023-02-08 DIAGNOSIS — E66.01 MORBID OBESITY (H): ICD-10-CM

## 2023-02-08 LAB
ALBUMIN SERPL BCG-MCNC: 4.4 G/DL (ref 3.5–5.2)
ALP SERPL-CCNC: 81 U/L (ref 35–104)
ALT SERPL W P-5'-P-CCNC: 26 U/L (ref 10–35)
ANION GAP SERPL CALCULATED.3IONS-SCNC: 15 MMOL/L (ref 7–15)
AST SERPL W P-5'-P-CCNC: 26 U/L (ref 10–35)
BILIRUB SERPL-MCNC: 0.8 MG/DL
BUN SERPL-MCNC: 18.1 MG/DL (ref 6–20)
CALCIUM SERPL-MCNC: 10.3 MG/DL (ref 8.6–10)
CHLORIDE SERPL-SCNC: 99 MMOL/L (ref 98–107)
CORTIS SERPL-MCNC: 7.9 UG/DL
CREAT SERPL-MCNC: 0.87 MG/DL (ref 0.51–0.95)
DEPRECATED HCO3 PLAS-SCNC: 25 MMOL/L (ref 22–29)
ERYTHROCYTE [DISTWIDTH] IN BLOOD BY AUTOMATED COUNT: 11.8 % (ref 10–15)
GFR SERPL CREATININE-BSD FRML MDRD: 78 ML/MIN/1.73M2
GLUCOSE SERPL-MCNC: 112 MG/DL (ref 70–99)
HBA1C MFR BLD: 6.4 % (ref 0–5.6)
HCT VFR BLD AUTO: 41.5 % (ref 35–47)
HGB BLD-MCNC: 14.9 G/DL (ref 11.7–15.7)
MCH RBC QN AUTO: 31.6 PG (ref 26.5–33)
MCHC RBC AUTO-ENTMCNC: 35.9 G/DL (ref 31.5–36.5)
MCV RBC AUTO: 88 FL (ref 78–100)
PLATELET # BLD AUTO: 223 10E3/UL (ref 150–450)
POTASSIUM SERPL-SCNC: 4.8 MMOL/L (ref 3.4–5.3)
PROT SERPL-MCNC: 7.9 G/DL (ref 6.4–8.3)
PTH-INTACT SERPL-MCNC: 57 PG/ML (ref 15–65)
RBC # BLD AUTO: 4.72 10E6/UL (ref 3.8–5.2)
SODIUM SERPL-SCNC: 139 MMOL/L (ref 136–145)
TSH SERPL DL<=0.005 MIU/L-ACNC: 2.11 UIU/ML (ref 0.3–4.2)
WBC # BLD AUTO: 6.8 10E3/UL (ref 4–11)

## 2023-02-08 PROCEDURE — 85027 COMPLETE CBC AUTOMATED: CPT

## 2023-02-08 PROCEDURE — 36415 COLL VENOUS BLD VENIPUNCTURE: CPT

## 2023-02-08 PROCEDURE — 84443 ASSAY THYROID STIM HORMONE: CPT

## 2023-02-08 PROCEDURE — 82533 TOTAL CORTISOL: CPT

## 2023-02-08 PROCEDURE — 82306 VITAMIN D 25 HYDROXY: CPT

## 2023-02-08 PROCEDURE — 80053 COMPREHEN METABOLIC PANEL: CPT

## 2023-02-08 PROCEDURE — 83970 ASSAY OF PARATHORMONE: CPT

## 2023-02-08 PROCEDURE — 99205 OFFICE O/P NEW HI 60 MIN: CPT | Performed by: EMERGENCY MEDICINE

## 2023-02-08 PROCEDURE — 83036 HEMOGLOBIN GLYCOSYLATED A1C: CPT

## 2023-02-08 RX ORDER — SEMAGLUTIDE 1 MG/.5ML
1 INJECTION, SOLUTION SUBCUTANEOUS
Qty: 2 ML | Refills: 0 | Status: SHIPPED | OUTPATIENT
Start: 2023-02-08 | End: 2023-02-15

## 2023-02-08 RX ORDER — SEMAGLUTIDE 0.5 MG/.5ML
0.5 INJECTION, SOLUTION SUBCUTANEOUS
Qty: 2 ML | Refills: 0 | Status: SHIPPED | OUTPATIENT
Start: 2023-02-08 | End: 2023-02-15

## 2023-02-08 RX ORDER — SEMAGLUTIDE 1.7 MG/.75ML
1.7 INJECTION, SOLUTION SUBCUTANEOUS
Qty: 3 ML | Refills: 0 | Status: SHIPPED | OUTPATIENT
Start: 2023-02-08 | End: 2023-02-15

## 2023-02-08 RX ORDER — SEMAGLUTIDE 0.25 MG/.5ML
0.25 INJECTION, SOLUTION SUBCUTANEOUS WEEKLY
Qty: 2 ML | Refills: 0 | Status: SHIPPED | OUTPATIENT
Start: 2023-02-08 | End: 2023-02-15

## 2023-02-08 RX ORDER — SEMAGLUTIDE 2.4 MG/.75ML
2.4 INJECTION, SOLUTION SUBCUTANEOUS
Qty: 3 ML | Refills: 9 | Status: SHIPPED | OUTPATIENT
Start: 2023-02-08 | End: 2023-02-15

## 2023-02-08 NOTE — PATIENT INSTRUCTIONS
"Plan:  Aim for mindful protein and vegetable rich meals with good hydration as has worked well for you in the past. Focus on 22-28 grams of protein per meal, every 4.5-6 hours with plans for a snack if needed to bridge to the next meal, still feeling in control. Considering a protein supplement if needed to hit your 65-85grams per day of protein.    2. Aim for 70-85 oz per day of water.    3. Recommend tracking nahun like [employer].    4. If covered affordably, Wegovy can start on a Thursdays/Friday and once weekly with ramping up over the next 4 months to an eventual 2.4mg/week if tolerated. Stop if rash/throat swelling or severe upset stomach/nausea/vomiting or severe pain.  Check out [employer] web site for coupon information.     5. Check labs today.      What makes a person succeed with dramatic and sustained weight loss?    It's being at the right point in your life where you feel the need to lose the weight, not because anyone told you so but because of a voice inside of you that says, \"I am ready for this\".  You're now at a point where you may be feeling anxious, irritable and when you look in the mirror you do not recognize the person looking back.  Your healthy self is buried somewhere in that reflexion and you want to free it again.  This is the sort of motivation that leads to success, and it comes from you.    Because the only person that can lose that extra weight is you, I like my patients to focus on the mindset of being in Weight Loss Season.  This gives you permission to make the changes necessary to be consistent with the diet/activity and behavior changes that lead to successful, healthy weight loss.  Nearly any diet plan can work for weight loss, but keeping it healthy and nutrient based to prevent deficiencies/hair loss/fatigue or irritability is vital.  If you have a plan you want to try, we'll work with you to make sure no adjustments are needed to keep you healthy through your weight loss " "season and working with our Bariatric Dieticians you'll be given expert guidance to customize your diet plan to suit your particular needs. If you don't have your own ideas in mind, we are always happy to suggest well researched and validated plans that provide enough food to prevent hunger but still tap into your excess fat reserves and lose weight in a sustainable fashion.  There is great evidence that lean protein/healthy fat intake with good fiber intake while minimizing simple starches/carbs produces reliable/sustainable weight loss in most people. But some feel more connected to an intermittent fasting/fast mimicking or ketogenic diet.  These protocols can be hard for many to stick with and that's why we prefer the protein/healthy fat focused diets but if these alternative strategies appeal to you, we can work with you to optimize your knowledge and results with these tools.    Losing weight is a temporary commitment, but you need to be \"All In\" to have a good weight loss season.  To avoid frustration, you have to be willing to be on track 19 out of 20 days or even better than that. But, weight loss season is generally only 4-8 months in length. After that length of time, it can be hard to maintain a negative calorie balance and our brain, motivations and metabolism will usually bring you to a plateau that cannot be broken in this modern world where other commitments start to take priority. That's when we look to stabilize the weight loss you've achieved.  If you've reached your goal by that time, fantastic, and job well done.  If there is more to go, then after a few months of stabilization, we can usually attack that previous plateau and break into new territory.    Because of this time limitation, we want to really get to work right away and get into a sustainable routine ASAP.  One of the best predictors of how much weight you're going to lose throughout the season is how much you lose in the first 6 " "weeks, so prepare well and jump in with both feet.      Occasionally, people may feel like they cannot commit fully to the changes necessary and may want to change one thing at a time and \"get used to\" the idea of losing weight.  That is OK because that is where they are in their life, and they cannot fully commit for any number of reasons.  It's part of that internal motivation and they just haven't reached PRIORTY NUMBER ONE status yet. It's possible that what they need is more time to reach that point and I am always willing to work with people that want to \"dabble\", but understand, the amount of success obtained with half measures, is much less than half results. Behavior Change cannot occur until we prepare our minds, bodies and environment for what is to come, action!    As you go through your plan, look for things to keep your motivation rolling.  The most successful people have a goal or target/reward that they are working towards.  Having a reward that celebrates your new fitness, mobility and energy is the best sort because it will encourage you to do well with the weight maintenance phase and long term lifestyle changes that promotes keeping the weight off for the long term.  Usually, \"getting healthier\" or improving blood tests or losing weight so your clothes fit better is not as internally motivating as having a tangible reward.  A good weight loss season reward is one that isn't food based, is affordable, but something special:  Something you won't be getting/doing unless you achieve your goal.   It s important to keep to the rule of success:  in order to get the reward, your goal MUST be achieved. Write this reward down, where you can look at it daily and keep it in the front of your mind as you go through your weight loss season and it will help keep you on track.    Tools that help change behavior are vital for success. The most studied and most supported tool for weight loss is nothing more than " writing down your food and weight every day.  Every Day.  Accurately and completely.  When you commit to weight loss season, this information tells you whether you're getting ENOUGH food to fuel your weight loss properly as well as teaches you the interaction between different foods you eat and how your body responds with weight loss.  You'll see that sometimes after a heavy workout you don't see the scale move until 2-3 days later.  How saltier meals (chili for example) may make you retain water for 4-5 days before you see the weight come off, you'll get used to the mini-plateaus that develop after a good 3-8 lb drop in weight as well as how you break through if you keep working the diet as you should.    Weight loss is not a linear process, there are mini ups and downs.  Learning how your body loses weight and getting comfortable with that is very rewarding. The act of writing words on paper also solidifies your will power and commitment to the season of weight loss and that by itself changes your brain chemistry/appetite, motivation and prepares you for maximal success.     Behavior change is all about getting into a new routine.  The old habits and routine have to change because without changing the circumstances of how you gained your weight, it's unlikely you'll enjoy satisfying results. If you have snacking habits, like every time you walk through the kitchen you grab a little something, well, that habit has to change and be replaced by a new habit.  It can be something as simple as keeping a doodle pad on the counter that you make a few scribbles and then walk through the kitchen having not opened the cupboard, or starting with a glass of water and leaving the kitchen without anything else, or checking your food journal to see how many calories you have left for the day.  Boredom is the enemy as are the old habits. Break new ground and try to push those old habits into a deep hole.  There are apps/counseling  "options available that can help with some of the day to day urges/behaviors if you're struggling. One commercial product that does a good job is [employer].  Unfortunately, there is a subscription fee.    Finally, exercise always helps.  While not mandatory to lose weight, every little bit helps and exercise has so many other benefits that to not work it into your plan is to miss out on all the mood, sleep, stress and general health benefits that come from making yourself a little short of breath and sweaty at least 3-4 days out of the week.  The metabolism and calorie burn benefits aside, almost every chronic ailment in medicine gets better with proper, aerobic exercise.  Allow yourself to start slow and let your body prepare itself to accept harder training 4-6 weeks down the road, but start now and commit to a plan.  Whether you have the means to hire a , join a gym or just walk out your front door or go down to your basement for a video workout, get into a exercise routine and  after 3 weeks of at least 3 times a week exercise you should be at a point where you can slowly start ramping up 10% each week to our goal of at least 150-300minutes weekly of aerobic exercise and at least twice weekly resistance training/strenghtening with weights/bands or body weight exercises.     I am a big fan of modifying the free training plan, \"Couch to 5k\", for almost all of my patients. Just type it into [employer] or look it up on your smart phone nahun store.  To modify the plan,  you can use the training plan for whatever aerobic activity you do (bike/treadmill/elliptical/rower/pool/etc). During the \"jog\" intervals, you just move a little faster or harder or increase the tension or incline.  You use those little intervals to switch up the workout and recruit more muscles and pump the blood a little more and then recover again in the \"walk\" intervals by slowing down, decreasing the incline or turning down the tension.  3-4 days a " week is not that much to ask and the benefits are enormous.  Start slow and develop the base from which you can then build on and reduce the risk of injury.  It's much more important 2-4 months from now to be enjoying your exercise then it is to over exert yourself at the start and hurt yourself.  Starting slowly allows your body to accept the training better down the road when the exercise becomes crucial for weight maintenance.  Without exercise down the road during your maintenance phase, all this hard work you are about to put in can be undone. It usually takes about 100-300 calories a day of exercise to maintain a weight loss and our focus during weight loss season is to generate the routine/activities and hobbies that make that enjoyable/sustainable.    Thanks for taking this first and most important step in your weight loss season.  Commit to it and we will cheerlead you all the way to success.  When things get tough or off track we'll offer guidance and analysis and when you reach your goal we'll celebrate your success.  In the end, it is all about your success, your health and what you do with it.      Kel Dorado MD  [facility]  [phone]        LEAN PROTEIN SOURCES  Getting 20-30 grams of protein, 3 meals daily, is appropriate for most people, some need more but more than about 40 grams per meal is not useful.  General rule is drinking one ounce of water per gram of protein eaten over the course of the day:  70 grams of protein each day, drink 70 oz of water.  Protein Source Portion Calories Grams of Protein                           Nonfat, plain Greek yogurt    (10 grams sugar or less) 3/4 cup (6 oz)  12-17   Light Yogurt (10 grams sugar or less) 3/4 cup (6 oz)  6-8   Protein Shake 1 shake 110-180 15-30   Skim/1% Milk or lactose-free milk 1 cup ( 8 oz)  8   Plain or light, flavored soymilk 1 cup  7-8   Plain or light, hemp milk 1 cup 110 6    Fat Free or 1% Cottage Cheese 1/2 cup 90 15   Part skim ricotta cheese 1/2 cup 100 14   Part skim or reduced fat cheese slices 1 ounce 65-80 8     Mozzarella String Cheese 1 80 8   Canned tuna, chicken, crab or salmon  (canned in water)  1/2 cup 100 15-20   White fish (broiled, grilled, baked) 3 ounces 100 21   [place]/Tuna (broiled, grilled, baked) 3 ounces 150-180 21   Shrimp, Scallops, Lobster, Crab 3 ounces 100 21   Pork loin, Pork Tenderloin 3 ounces 150 21   Boneless, skinless chicken /turkey breast                          (broiled, grilled, baked) 3 ounces 120 21   [place], [place], [place], and Venison 3 ounces 120 21   Lean cuts of red meat and pork (sirloin,   round, tenderloin, flank, ground 93%-96%) 3 ounces 170 21   Lean or Extra Lean Ground Turkey 1/2 cup 150 20   90-95% Lean Shelly Burger 1 stephen 140-180 21   Low-fat casserole with lean meat 3/4 cup 200 17   Luncheon Meats                                                        (turkey, lean ham, roast beef, chicken) 3 ounces 100 21   Egg (boiled, poached, scrambled) 1 Egg 60 7   Egg Substitute 1/2 cup 70 10   Nuts (limit to 1 serving per day)  3 Tbsp. 150 7   Nut Rough and Ready (peanut, almond)  Limit to 1 serving or less daily 1 Tbsp. 90 4   Soy Burger (varies) 1  15   Garbanzo, Black, Steve Beans 1/2 cup 110 7   Refried Beans 1/2 cup 100 7   Kidney and Lima beans 1/2 cup 110 7   Tempeh 3 oz 175 18   Vegan crumbles 1/2 cup 100 14   Tofu 1/2 cup 110 14   Chili (beans and extra lean beef or turkey) 1 cup 200 23   Lentil Stew/Soup 1 cup 150 12   Black Bean Soup 1 cup 175 12           Example Meal Plan for a 8642-4606 Calorie Diet:    In order to fuel your weight loss properly and avoid hunger-induced overeating later in the day, for your height and weight, you will enjoy the most success by following the diet below or similar with adjustments based on your particular tastes and preferences.  Exercise may influence speed, amount of weight loss further.     I  recommend getting into a meal routine and keeping it similar day to day in the beginning so you don t have to think too hard about what you re going to make/eat.  Keep snacks healthy, ideally containing protein and some vegetables.  Non-processed food is preferable to packaged items.  Eat at least a few crunchy green vegetables if having a snack, which should be 2-3 hours after your mealtimes(prepare these ahead of time for ease of use).  Drink 64 oz -80 oz of water daily for most, some of you will need more and we'll discuss it at your visit if that is the case.      When changing our diet,  we can often mistake thirst for hunger or just have some distracted eating habits that we need to break free from ('bored/mindless eating', screen time,work, driving,etc).  A glass of water and reconsideration of our hunger is often all that is needed.  Having the urge is not the problem, but watching it pass by without acting on it is the goal.    If you re having hunger problems, add a protein drink/snack to your morning hours or afternoon snack with at least 20grams of protein and not too much sugar (under 10g).  A carton of higher protein/low sugar yogurt can work as well.  If the urge to snack is overwhelming and not satiated, try going for a 10 minute walk/exercise, come home and drink a glass of water and if still hungry, have a  calorie snack (handful of raw/sprouted nuts, veggies and string cheese, protein bar, etc).  Savor it.    It is better to have a large breakfast, a moderate lunch and a smaller dinner to fuel your day.  People lose 10-15% more weight during their weight loss season with this strategy. Optimizing your protein intake at each meal will further keep you more satisfied while eating less food overall.  Getting exercise in early has also been shown to offer the best results (before breakfast ideally but anytime is the right time to exercise if that is not an option for you).    To make sure you re  getting adequate vitamins and minerals during weight loss, I recommend one complete multivitamin a day of your choice.  Consider a probiotic and taking some vitamin D 2000 IU daily.    Let supper be your last meal of the day and ideally try to have at least 12 hours between supper and breakfast the next day to tap into some beneficial overnight fasting dynamics.  Midnight snacks need to go away. Water in the evening is fine, unsweetened, non caffeinated herbal tea is helpful as well.  Consolidating your meals within a 8-12 hour period of your day will help tap into these additional metabolic benefits and tends to keep your appetite up for breakfast, further helping to stay on track.  For most of my patients, I don't recommend an intermittent fasting style diet (many find it hard to fit in their lifestyle) but an overnight fast is very doable for most patients and helps regulate our hunger drives a little better.  This makes it very important to nail good intake at all three meals to feel satisfied/energized and still lose weight.      If evening snacking desires are high, consider a glass of fiber supplement for some additional fullness (metamucil or similar). Most of us don't get the 25-30 grams per day of fiber that promotes good gut health/satiety.  Benefiber, metamucil, citrucel are reasonable/affordable options for most people.  Inulin, chicory, psyllium husk are reasonable options but start slow and low in the dose to avoid gas/bloating until your gut gets acclimated (ramping up to 5-10 grams per day of supplemental fiber after 3-4 weeks if needed).      Example Meal Plan:  Breakfast: 450-475 Calories  1 egg cooked on low in olive oil:   calories.  5oz Greek Yogurt (Fage plain classic: ~150 marilee)  Handful of Berries of your choice (about a calorie per berry or 20-40cal per handful)    cup(cooked) of  old fashioned oatmeal or 1/2 cup(cooked) steel cut oats. (150 marilee)  Sprinkle amount of brown sugar and a  pat of butter. (40 marilee)  Glass of  Water  Black coffee or unsweetened Tea (0calories).      2-3 hours Later Snack: (195 calories).  Glass of water  One string Cheese (80 calories) or 4 oz creamed cottage cheese (115 calories) with  Crunchy Celery sticks (less than 10 calories per large stalk) 2 stalks. (20 calories)    of a  Large Banana or   of a Large Apple (60 calories):  eat second half at lunch or afternoon snack.     Lunch:300 -350 calories   Chicken Breast  (baked/broiled/roasted/grilled)  4-6 oz.  (125-180 marilee), BBQ sauce/hot sauce/mustard/seasoning is free. Just use a reasonable amount. Or a can of tuna with 1 tablespoon mayonnaise.  Salad: lettuce, any other veggies (cucumbers, green peppers/celery you like and a small drizzle of dressing to just flavor.  Go as big on the veggies as you like,  as they are practically calorie free.   A whole, 8 inch cucumber is 45 calories, a whole green pepper is 23 calories, a stalk of celery is 9 calories.  [place] Dressing is 60 calories per tablespoon..so moderate your desired dressing or do a drizzle of olive oil and splash of balsamic vinegar on top,  Total calories unlikely to be over 150 even with dressing.  Glass of Water.    Option for lunch is meal replacement protein drink/smoothie.  Need at least 20 grams of protein and eat the rest of your apple/banana from the morning snack.      Afternoon Snack: 150-200 calories   Cheese Stick or cottage cheese again  and a fresh fruit OR  Granola Bar (protein Bar acceptable if under 200 calories OR  Homemade smoothies:  8oz skim milk,  a handful of berries (fresh or frozen and a serving of protein powder such as BiPro or Ludy sWhey for example.  If you don't like dairy, make with 8oz water, one small banana, handful of berries and the protein powder, add any veggies you want as well:  roughly 200 calories.   Glass of Water    Dinner: 325 calories  4oz of fresh, Atlantic salmon.  Broiled (salt/pepper/dill) for about  8-8.5 minutes (200calories) or  4oz filet mignon steak or sirloin steak  Salad or vegetable sautéed lightly in olive oil or   Broccoli 1.5  cups chopped and steamed  or micro-waved in a little water (75 calories)  Glass of Water,    Cup of herbal tea (unsweetened, caffeine free)      Herbs and seasonings are encouraged to flavor your foods/vegetables.  Make your food delicious.      Tips for Success:  1.  Prepare proteins ahead of time (broil chicken breasts in bulk so you can grab and go), steel cut oats/lentils can be stored in casserole dish/bowl in the fridge for quick scoop in the morning and rewarm in microwave, make use of crock pot recipes (watch salt content).  Making meals that cover 3-4 future meals is an easy way to stay on track.  2.  Drink a 8-12 oz glass of water every 2-3 hours when awake.  We often mistake hunger for thirst, especially when losing weight.  3. Remember your Reward and Motivation when things get hard.  4.  Weigh yourself every morning and record, you'll stay on track better and learn how our biorhythms, diet and elimination patterns show up on the scale. Don't worry about 1 or 2 day patterns, but when on track you'll notice good trend downward of weight over 3-4 day segments.  Plateaus tend to resolve after 4-8 days in most cases if you stay consistent with your plan.  These are natural and part of weight loss, even if you're perfect with your plan execution.  5. Call if problems/concerns.  [employer] is a great tool to stay in touch and provide weekly outside accountability. Check in with questions or if you want to brag.  6.  Find a handful of meals/foods that keep you on track and feeling good and get into a routine that is sustainable for you.  It's OK to have a routine that works for you.  7.  Consider taking a complete multivitamin just to make sure all micronutrients are adequate during weight loss.  8. If losing hair/brittle nails it usually means you are not taking enough  "protein.  Minimum goal is 60 grams daily of protein for smaller women, 80 grams a day for men. Consider taking Biotin as supplement or a \"Hair and Nail\" multivitamin.      On-the-Go Breakfast Ideas  As of 2015, the latest research shows what a huge impact eating breakfast has on losing weight and feeling your best. People lose more weight when they make breakfast their biggest meal of the day compared to Dinner, but even if you cannot go to that degree, getting a breakfast that has at least 20 grams of protein and even a moderate amount of fat is ideal for maintaining good energy through the day and limits overeating in the evening hours.  The following are some quick and easy suggestions for at least getting something of substance into your body in the morning.  Enjoy!    Eating breakfast within 90 minutes of waking up is an important part of taking care of your body on a restricted calorie diet plan.  After sleeping for hours, your body is in need of fuel.  An ideal breakfast is a combination of protein, whole-grain carbohydrates, or fruit.  Here s why:    -Protein digests very slowly in the body, helping you feel more satisfied.  -Whole grains provide dietary fiber, which also digests slowly and helps keep your gut clean.  -Fruit is a great source of vitamins, minerals, and fiber.     Each one of these breakfast combinations has between 200-300 calories and 15-20 grams of protein.  Feel free to mix and match!    Bone Broth (chicken bone broth or beef bone broth) is a great way to boost protein content. 8oz of bone broth will typically have 9-12grams of protein for 40kcal of energy.    Protein: Choose  -1/2 cup low-fat cottage cheese  -2 hard boiled eggs , or one cooked in olive oil (low/slow heat).  -1 low fat string cheese stick  -1 Tablespoon natural peanut butter  -Rank & Style vegetarian sausage stephen (found in freezer section)  -1 slice lowfat cheese  -6 oz 2% or lowfat Greek yogurt, such as Fage or " Aristeo.    PLUS    Whole Grains:  Choose   -1 whole wheat English muffin  -1 whole wheat jules, half  -1/2 Fiber One frozen muffin, thawed  -1/2 Fiber One toaster pastry  -1 whole wheat bagel thin  -1/2 cup Kashi cereal  -1 Kashi waffle (or other whole grain high-fiber waffle)  Aim for whole grain/sprouted breads with at least 3g of fiber/slice if having bread. [place] is one such brand.    OR    Fruit: Choose  -1/3 cup blueberries  -1/2 banana (or a plantain- similar to a banana, yet smaller)  -1/2 cup cantaloupe cubes  -1 small apple  -1 small orange  -1/2 cup strawberries  -handful raspberries/blackberries (each berry is about 1 calorie).    *Adapted from [facility], Fall 20    Ten Breakfasts Under 250 calories    Ideally, getting between 350-600 calories  (depending on starting height and weight)for breakfast is ideal for avoiding hunger later in the day, adjust/add to the following accordingly:    One- 250 calories, 8.5 g protein  1 slice whole-grain toast   1 Tbsp peanut butter    banana    Two- 250 calories, 8 g protein    cup nonfat/lowfat yogurt  1/3rd cup diced no-sugar peaches  1/3rd cup cereal (like Special K, Cheerios, or bran flakes)    Three- 250 calories, 25 g protein  1 egg scrambled with 1 oz skim milk    cup shredded cheddar    whole grain English muffin  1 oz [place] nazario  1 tsp margarine spread    Four- 225 calories, 25 g protein  1/2 cup Kashi Go-Lean cereal    cup skim milk mixed with 1 scoop Bariatric Advantage protein powder    cup no-sugar diced pears    Five- 250 calories, 20 g protein    cup oatmeal prepared with skim milk, 1 scoop protein powder, and sugar-free maple syrup    Six- 200 calories, 5 g protein  1 whole grain waffle, toasted  1 tablespoon creamy peanut or almond butter    Seven-  250 calories, 19 g protein  Breakfast sandwich: 1 slice whole grain toast, cut in half.  Add 1 scrambled egg and one slice cheddar  cheese.    Eight-  250 calories, 15 g protein  2 eggs  scrambled with 1/3 cup frozen spinach (heat before adding to eggs) and 2 tablespoons low fat cream cheese.    Nine-  150 calories, 15 g protein  2/3rd cup cottage cheese    cup cantaloupe    Ten- 200 calories, 20 g protein  Fruit smoothie made with 4 oz. nonfat Greek yogurt,   cup berries, 1 scoop protein powder, and 4 oz skim milk.    Ten Lunches Under 250 Calories    Aim for lunch to be around 300-400 calories a day when trying to lose weight and get that protein in!    One- 200 calories, 11 g protein  1/3 cup tuna salad made with light tam on 1 slice whole grain bread  1 small peeled apple    Two- 250 calories, 16 g protein  1/3 cup lowfat cottage cheese    cup cooked green beans    small fruit cocktail (in natural juice)    Three- 200 calories, 11 g protein    grilled cheese sandwich on whole grain bread with lowfat cheese  2/3rd cup of tomato soup    Four- 250 calories, 22 g protein  Deli wrap: 1 oz sliced turkey, 1 oz sliced ham, 1 oz sliced chicken rolled up with 1 slice low-fat cheese  1 small orange    Five- 250 calories, 28 g protein  2/3rd cup chili with 1 oz shredded cheese  4 saltine crackers    Six- 250 calories, 22 g protein  1 cup fresh spinach with 2 oz chicken, 1/3rd cup mandarin oranges, and 2 tablespoons sliced almonds with 1 tablespoon  vinaigrette dressing    Seven- 200 calories, 11 g protein  1 Tbsp sugar-free preserves and 1 Tbsp peanut butter on 1 slice whole grain toast    cup nonfat/lowfat Greek yogurt    Eight- 250 calories, 18 g protein  1 small soft-shell chicken taco with 1 oz shredded cheese, lettuce, tomato, salsa, and 1 Tbsp light sour cream    cup black beans    Nine- 225 calories, 13 g protein  2 ounces baked chicken  1/4 cup mashed potatoes    cup green beans    Ten- 200 calories, 21 g protein  Deli jules: 2 oz roast beef or other deli meat with 1 tsp Sameer mayonnaise and sliced tomato, onion, and lettuce  1/3rd cup cottage cheese      Ten Dinners Under 300 calories    If you're  eating a large breakfast and medium lunch, keep dinner small.  300-400 calories is ideal for most people depending on their caloric needs.    One- 300 calories, 12 g protein  1-inch thick slice of turkey meatloaf    cup baked butternut squash    Two- 200 calories, 9 g protein  Bread-less BLT: 3 slices turkey nazario, sliced tomato, wrapped in a large lettuce leaf    cup peeled fruit    Three- 275 calories, 36 g protein  3 oz roasted chicken    cup cooked broccoli    cup shredded cheddar cheese    cup unsweetened applesauce    Four- 200 calories, 25 g protein  3 oz baked tilapia  1/3rd cup cooked carrots    cup yogurt    Five- 250 calories, 20 g protein  Grilled ham  n  Swiss: spread 2 tsp ghee or butter on 1 slice of whole grain bread.  Cut bread in half, layer 2 oz deli ham with 1 piece of Swiss cheese and grill until cheese is melted.    cup cooked vegetables    Six- 250 calories, 18 g protein  Vegetarian cheeseburger: 1 [place] cheeseburger topped with lettuce, onion, tomato, and ketchup/mustard    cup sweet potato fries    Seven- 250 calories, 18 g protein  Pork pot roast: 2 oz roasted pork loin, 1/3rd cup roasted carrots,   medium potato, cooked with   cup gravy    Eight- 330 calories, 25 g protein  2 oz meatballs (about 2 small meatballs)    cup spaghetti sauce  1/2 piece toast topped with 1 tsp ghee or butterand topped with garlic powder, toasted in oven    Nine- 250 calories, 16 g protein  Mexican pizza: one 8  corn tortilla topped with 2 oz chicken,   cup salsa, 2 tablespoons black beans, 2 tablespoons shredded cheese.  Bake until cheese is melted.    Ten- 250 calories, 22 g protein  Shrimp stir-wise: 3 oz cooked shrimp, 1/6th onion,   pepper,   cup chopped carrots sautéed in 1 tablespoon olive oil, topped with 2 tablespoons stir wise sauce and a pinch of sesame seeds        150 Calories or Less Snack Ideas   1 hardboiled egg with   cup berries  1 small apple with 1 hardboiled egg  10 almonds with   cup berries  2  clementines with 1 light string cheese  1 light string cheese with   sliced apple  1 light string cheese wrapped in 2 slices of turkey  4 100% whole wheat crackers (e.g. Triscuit) with 1 light string cheese    c. cottage cheese with   cup fruit and 1 Tbsp sunflower seeds     cup cottage cheese with   of an avocado     can tuna fish with 1 cup sliced cucumbers     cup roasted garbanzo beans with paprika and cayenne pepper    baked sweet potato with   cup chili beans or   cup cottage cheese  2 oz. nitrate free turkey slices with 1 cup carrots  1 container (6 oz) of low sugar (less than 10 grams of sugar) greek yogurt   3 Tablespoons of hummus with 1 cup sliced bell peppers   2 Tablespoons of hummus with 15 baby carrots  4 Tablespoons ranch dip made with plain Greek Yogurt and 3 mini cucumbers  1/4 cup nuts (any kind)  1 Tablespoon peanut butter with 1 stalk celery   1 dill pickle wrapped in 1-2 slices of deli ham with 1 tsp of mayonnaise/mustard.      Exercise Guidance    Nearly everything that bothers us gets better when the proper amount of exercise can be done in the proper amounts.  Getting to that level safely and without injury is the key.  When it comes to weight loss, exercise is especially important in maintaining the weight loss.  Unfortunately, one of the harsh realities is that substantial weight loss slows our metabolism, often anywhere from 5-20%.    Our brain always remembers our heaviest weight and we can return to that if we're not mindful and moving regularly.  Our biology doesn't understand the concept of having too much energy, only not having enough.  As such, when we lose weight, it's thought that the brain interprets this as we're ill or in a famine and dials back our metabolism to limit further weight loss.  This is why exercise is so important in keeping the weight off and is the main reason people have some weight regain from their low weight point after weight loss.  We have to make up that  10-20% of calories not being burned.Since we can restrict our intake for only so long, exercise becomes very important in our long term healthy weigh maintenance to balance out the occasional indiscretion with our diet.    Generally, for every 5% body weight reduction in a weight loss season, a person needs to add  kilocalories of exercise in their daily routine to keep that weight off for the long term.  This is why it's vital to be starting your fitness regimen during weight loss season, so that routine is well established as you move into your maintenance period.    Additionally, all sorts of good enzymes and genes turn on with exercise and our stress, sleep, mood and bodies feel better when we can get to the point of making ourselves a little sweaty and short of breath 35-50 minutes most days of the week. But we have to start with what we can do first and give ourselves permission to work our way up to this goal.    Who isn't ready for exercise? Well, if you get severe dizziness/palpitations, chest pain or short of breath/faint with even minimal activity like walking across a room or you're having to pause while going up a flight of stairs, then getting your heart and/or lungs fully evaluated prior to starting an exercise regimen is recommended. Everyone else can probably start a program, but everyone may start at a different point:  Some can set a 5-10 minute walking goal and others will be able to ride their bike for an hour.      Start with where you're at and look to add 10% more each week until you're at that 150 minutes or more a week (or 75 minutes/week or more of vigorous exercise). Moderate exercise can be estimated as the pace you can carry on a conversation and vigorous is the pace at which you can get 3-5 words out before having to take a breath.  If you're using heart rate monitoring, Moderate is about 60% of your maximum heart rate and vigorous about 75%. (Max heart rate estimated as 220  "beats minus your age:  Example: 220-age of 45 =176 Beats per minute (BPM) maximum. 0.6X 176= 105 BPM (moderate), 132 BMP(vigorous)).    If you like to count steps, the 10,000 steps per day does correlate well with weight maintenance but try to make at least 20-25% of those steps at a brisk pace (like you are about to miss your bus).    Finally, if you are pressed for time, it's important to know that some exercise is better than none.  High Intensity Interval training (HIIT) is a good way to get as much out of a short period of working out. If you can't walk, use the stairs, bike or swim; you could use a punching/arm workout regimen for your activity.  The idea with HIIT is to have a 3-6 minute warm up period of low intensity and the 3-6 \"intervals\" where you push the intensity up and then recover and start the next interval. One study showed that 3 intervals of 20 seconds at \"Maximum Effort\" while either biking on a stationary bike or going up stairs and then having 100 seconds recovery time before the next Maximum Effort was equally as beneficial on cardiovascular fitness development as doing 30 minutes of moderately paced walking 3 days weekly over a 6 week period of time.  So intensity matters. You just need to be able to safely do your desired exercise without injury. There are many great HIIT exercises/routines out there. IF you're not doing much exercise currently, I recommend giving your self 2-3 weeks of moderate exercise, 3 days weekly minimum to get your bones/tendons/muscles used to exercise before going for High Intensity workouts.    If you like to use Apps on the phone, the couch to 5k nahun and 7 minute workout apps are nice places to start if you are reasonably healthy.  There are hundreds of other options out there.  Consider viewing [employer] if gentler exercise/movement is desired. Videos on Kwaku Chi and chair yoga for seniors exist and are free. Check them out and let's get that 3-4 days a week " routine going.    Let's move!  Kel Dorado MD.

## 2023-02-08 NOTE — LETTER
"    2023         RE: Monae Martinez  [address]  [place] [address]        Dear Colleague,    Thank you for referring your patient, Monae Martinez, to the [facility]. Please see a copy of my visit note below.    New Medical Weight Management Consult    PATIENT:  Monae Martinez  MRN:         [id number]  :         1965  DAVEY:         2023        I had the pleasure of seeing your patient, Monae Martinez. Full intake/assessment was done to determine barriers to weight loss success and develop a treatment plan. Monae Martinez is a 57 year old female interested in treatment of medical problems associated with excess weight. She has a height of 5' 2\", a weight of 262 lbs 0 oz, and the calculated Body mass index is 47.92 kg/m .    Monae is a patient with mature onset class III, morbid obesity with significant element of familial/genetic influence and with current health consequences. She does need aggressive weight loss plan due to her hypertension, central adiposity/BMI of 37.9 with history of low HDL that meet criteria for metabolic syndrome and related vascular risks..  Monae Martinez eats a high carb diet, uses food as a reward and uses food as mood management.      Her problem is complicated by strong craving/reward pathways, a binge eating component, gender and short stature and impaired metabolic perception.  In the past, she's been frustrated when her exercise didn't translate into weight loss and we discussed the importance of finding good exercise but how the nutritional plan will determine weight loss when active.  We'll work to build up her fitness again as she identifies feeling her best when regularly active like most people. No current injuries to limit her activity.      Review of the patient's history and habits today suggest that weight gain has been long term struggle.  She's not interested in surgical weight loss program today but could consider " if not finding success with medication supported dietary changes in the future. .    Previous Interventions found to be helpful in the past for weight loss include fitness focus helped her feel good but didn't translate to weight loss. .    We discussed a toolbox approach to weight management today and she is open to combining dietary therapy with inreased mindfulness techniques, activity/exercise to produce weight reduction. Medication assistance for appetite control was discussed today and on review of the risks/benefits for this patients health history, we've decided to start with appetite suppressant therapy.    ASSESSMENT AND PLAN  Problem List Items Addressed This Visit        Digestive    Morbid obesity (H)    Relevant Medications    Semaglutide-Weight Management (WEGOVY) 0.25 MG/0.5ML SOAJ    Semaglutide-Weight Management (WEGOVY) 0.5 MG/0.5ML SOAJ    Semaglutide-Weight Management (WEGOVY) 1 MG/0.5ML SOAJ    Semaglutide-Weight Management (WEGOVY) 1.7 MG/0.75ML SOAJ    Semaglutide-Weight Management (WEGOVY) 2.4 MG/0.75ML SOAJ   Other Visit Diagnoses     Obesity, Class III, BMI 40-49.9 (morbid obesity) (H)    -  Primary    Relevant Medications    Semaglutide-Weight Management (WEGOVY) 0.25 MG/0.5ML SOAJ    Semaglutide-Weight Management (WEGOVY) 0.5 MG/0.5ML SOAJ    Semaglutide-Weight Management (WEGOVY) 1 MG/0.5ML SOAJ    Semaglutide-Weight Management (WEGOVY) 1.7 MG/0.75ML SOAJ    Semaglutide-Weight Management (WEGOVY) 2.4 MG/0.75ML SOAJ    Other Relevant Orders    Comprehensive metabolic panel    Hemoglobin A1c (Completed)    CBC with platelets (Completed)    TSH    25- OH-Vitamin D    Parathyroid Hormone Intact    Cortisol    Metabolic syndrome X               Plan:  1. Aim for mindful protein and vegetable rich meals with good hydration as has worked well for you in the past. Focus on 22-28 grams of protein per meal, every 4.5-6 hours with plans for a snack if needed to bridge to the next meal, still  feeling in control. Considering a protein supplement if needed to hit your 65-85grams per day of protein.    2. Aim for 70-85 oz per day of water.    3. Recommend tracking nahun like [facility].    4. If covered affordably, Wegovy can start on a Thursdays/Friday and once weekly with ramping up over the next 4 months to an eventual 2.4mg/week if tolerated. Stop if rash/throat swelling or severe upset stomach/nausea/vomiting or severe pain.  Check out [employer] web site for coupon information.     5. Check labs today.    60 minutes spent on the date of the encounter doing chart review, history and exam, documentation and further activities per the note        She has the following co-morbidities:       2/7/2023   I have the following health issues associated with obesity: Pre-Diabetes, High Blood Pressure, Polycystic Ovarian Syndrome   I have the following symptoms associated with obesity: Knee Pain, Depression, Lower Extremity Swelling, Back Pain, Fatigue, Hip Pain   hx of uterine cancer and s/p hysterectomy: ovaries are intact. ? Perimenopausal. No hot flashes.  Graduated at 136 lbs. 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 Tried some Optifast type diets in the past without much success.  Tried intermittent fasting and self styled Keto.     She struggles mostly with simple carbs: potatoes, rice,bread tends to be the difficulty for her.   Feels she's ready for help here today.   In the past works out at gym in [place], yoga 1-2x weekly in class and on her own.   Low back/knee pain and stress work.  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  Sleeps well but snores. Worse at heavier weights.     Patient Goals 2/7/2023   I am interested in having a healthier weight to diminish current health problems: Yes   I am interested in having a healthier weight in order to prevent future health problems: Yes   I am interested in having a healthier weight in order  "to have a future surgery: No   stressors:  last year.    Referring Provider 2/7/2023   Please name the provider who referred you to Medical Weight Management.  If you do not know, please answer: \"I Don't Know\". Mattie VERONICA   discussed desire for weight management w/ her PCP on 1/11/23 visit on chart review and was referred here.    Weight History 2/7/2023   How concerned are you about your weight? Very Concerned   Would you describe your weight gain as gradual? No   I became overweight: As a Child   The following factors have contributed to my weight gain:  Mental Health Issues, Change in Schedule, Lack of Exercise, Genetic (Runs in the Family), Stress   I have tried the following methods to lose weight: Watching Portions or Calories, Exercise, Weight Watchers, Atkins-type Diet (Low Carb/High Protein), Nutrisystem, Meal Replacements, Fasting   My lowest weight since age 19 was: 135   My highest weight since age 19 was: 299   The most weight I have ever lost was: (lbs) 35   I have the following family history of obesity/being overweight:  My mother is overweight   Has anyone in your family had weight loss surgery? No   How has your weight changed over the last year?  Gained   How many pounds? 15   When losing weight in the past, she found 2 years ago when intermittent fasting and mindful diet was most helpful. Starting eating 1pm, high quality food, daytime veg snack through the day/bell peppers/tomatoes and then supper 6-7pm. Bed by 10pm. Was drinking 96oz of water. After meeting a jaime (retired ), feels Int Fasting no longer fits her life.  In the past tried phentermine ?fenfluramine in the 1990s and had excess thirst and palpitations and didn't lose weight.  Diet Recall Review with Patient 2/7/2023   Do you typically eat breakfast? No   If you do eat breakfast, what types of food do you eat? Eggs toast avocados omlete nazario sausage   Do you typically eat lunch? No   If you do eat lunch, what types of food " do you typically eat?  Tuna grilled cheese   Do you typically eat supper? Yes   If you do eat supper, what types of food do you typically eat? Chicken Shrimp Steak Pork Veggies Rice Pasta   Do you typically eat snacks? Yes   If you do snack, what types of food do you typically eat? Cucumbers Chocolate Almonds Sweet Peppers Popcorn   Do you like vegetables?  Yes   Do you drink water? Yes   How many glasses of juice do you drink in a typical day? 0   How many of glasses of milk do you drink in a typical day? 1   If you do drink milk, what type? Skim   How many 8oz glasses of sugar containing drinks such as Carlos-Aid/sweet tea do you drink in a day? 0   How many cans/bottles of sugar pop/soda/tea/sports drinks do you drink in a day? 0   How many cans/bottles of diet pop/soda/tea or sports drink do you drink in a day? 0   How often do you have a drink of alcohol? 2-3 TImes a Week   If you do drink, how many drinks might you have in a day? 1 or 2       Eating Habits 2/7/2023   Generally, my meals include foods like these: bread, pasta, rice, potatoes, corn, crackers, sweet dessert, pop, or juice. Once a Week   Generally, my meals include foods like these: fried meats, brats, burgers, french fries, pizza, cheese, chips, or ice cream. Less Than Weekly   Eat fast food (like McDonalds, Burger Garrett, Taco Bell). Less Than Weekly   Eat at a buffet or sit-down restaurant. Less Than Weekly   Eat most of my meals in front of the TV or computer. Once a Week   Often skip meals, eat at random times, have no regular eating times. A Few Times a Week   Rarely sit down for a meal but snack or graze throughout.  Less Than Weekly   Eat extra snacks between meals. Less Than Weekly   Eat most of my food at the end of the day. Almost Everyday   Eat in the middle of the night or wake up at night to eat. Never   Eat extra snacks to prevent or correct low blood sugar. Never   Eat to prevent acid reflux or stomach pain. Never   Worry about not  having enough food to eat. Never   Have you been to the food shelf at least a few times this year? No   I eat when I am depressed. A Few Times a Week   I eat when I am stressed. A Few Times a Week   I eat when I am bored. A Few Times a Week   I eat when I am anxious. A Few Times a Week   I eat when I am happy or as a reward. A Few Times a Week   I feel hungry all the time even if I just have eaten. A Few Times a Week   Feeling full is important to me. A Few Times a Week   I finish all the food on my plate even if I am already full. Once a Week   I can't resist eating delicious food or walk past the good food/smell. Everyday   I eat/snack without noticing that I am eating. Never   I eat when I am preparing the meal. Everyday   I eat more than usual when I see others eating. A Few Times a Week   I have trouble not eating sweets, ice cream, cookies, or chips if they are around the house. Less Than Weekly   I think about food all day. Everyday   What foods, if any, do you crave? Cheese   Please list any other foods you crave? Carbs Potatoes Rice Noodles       Amount of Food 2/7/2023   I make myself vomit what I have eaten or use laxatives to get rid of food. Never   I eat a large amount of food, like a loaf of bread, a box of cookies, a pint/quart of ice cream, all at once. Never   I eat a large amount of food even when I am not hungry. Weekly   I eat rapidly. Everyday   I eat alone because I feel embarrassed and do not want others to see how much I have eaten. Never   I eat until I am uncomfortably full. Never   I feel bad, disgusted, or guilty after I overeat. Everyday   I make myself vomit what I have eaten or use laxatives to get rid of food. Never       Activity/Exercise History 2/7/2023   How much of a typical 12 hour day do you spend sitting? Half the Day   How much of a typical 12 hour day do you spend lying down? Less Than Half the Day   How much of a typical day do you spend walking/standing? Half the Day    How many hours (not including work) do you spend on the TV/Video Games/Computer/Tablet/Phone? 2-3 Hours   How many times a week are you active for the purpose of exercise? 2-3 Times a Week   What keeps you from being more active? Lack of Time, Too tired   How many total minutes do you spend doing some activity for the purpose of exercising when you exercise? More Than 30 Minutes       PAST MEDICAL HISTORY:  Past Medical History:   Diagnosis Date     Hypertension        Work/Social History Reviewed With Patient 2/7/2023   My employment status is: Full-Time   My job is: Hospice Nurse   How much of your job is spent on the computer or phone? 50%   How many hours do you spend commuting to work daily?  Na   What is your marital status? Single   If in a relationship, is your significant other overweight? No   Do you have children? Yes   If you have children, are they overweight? Yes   Who do you live with?  Myself   Are they supportive of your health goals? Yes   Who does the food shopping?  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    Mental Health History Reviewed With Patient 2/7/2023   Have you ever been physically or sexually abused? No   If yes, do you feel that the abuse is affecting your weight? N/A   If yes, would you like to talk to a counselor about the abuse? N/A   How often in the past 2 weeks have you felt little interest or pleasure in doing things? Not at all   Over the past 2 weeks how often have you felt down, depressed, or hopeless? Not at all       Sleep History Reviewed With Patient 2/7/2023   How many hours do you sleep at night? 7   Do you think that you snore loudly or has anybody ever heard you snore loudly (louder than talking or so loud it can be heard behind a shut door)? Yes   Has anyone seen or heard you stop breathing during your sleep? No   Do you often feel tired, fatigued, or sleepy during the day? Yes   Do you have a TV/Computer  in your bedroom? No   ESS of 1.  STOPBANG of 4  Widely patent mallampati I airway..    Past Surgical History:   Procedure Laterality Date     BREAST CYST EXCISION Right      HC REMOVAL OF TONSILS,<12 Y/O      Description: Tonsillectomy;  Recorded: 10/28/2008;     HYSTERECTOMY  2007     Z  DELIVERY ONLY      Description:  Section;  Recorded: 10/28/2008;     Z TOTAL ABDOM HYSTERECTOMY      Description: Total Abdominal Hysterectomy;  Recorded: 2009;  Comments: BSO       Social History     Socioeconomic History     Marital status:      Spouse name: Not on file     Number of children: Not on file     Years of education: Not on file     Highest education level: Not on file   Occupational History     Not on file   Tobacco Use     Smoking status: Never     Smokeless tobacco: Never   Substance and Sexual Activity     Alcohol use: No     Comment: Alcoholic Drinks/day: once a month     Drug use: No     Sexual activity: Yes     Partners: Male     Birth control/protection: Surgical   Other Topics Concern     Not on file   Social History Narrative     Not on file     Social Determinants of Health     Financial Resource Strain: Not on file   Food Insecurity: Not on file   Transportation Needs: Not on file   Physical Activity: Not on file   Stress: Not on file   Social Connections: Not on file   Intimate Partner Violence: Not on file   Housing Stability: Not on file       MEDICATIONS:   Current Outpatient Medications   Medication Sig Dispense Refill     cholecalciferol (VITAMIN D3) 125 mcg (5000 units) capsule Take by mouth daily       cyclobenzaprine (FLEXERIL) 5 MG tablet Take 1 tablet (5 mg) by mouth every 8 hours as needed 90 tablet 1     escitalopram (LEXAPRO) 10 MG tablet Take 1 tablet (10 mg) by mouth daily 90 tablet 3     fluticasone propionate (FLONASE) 50 mcg/actuation nasal spray [FLUTICASONE PROPIONATE (FLONASE) 50 MCG/ACTUATION NASAL SPRAY] Apply 1 spray into each nostril daily. 16 g  "6     hydrochlorothiazide (HYDRODIURIL) 25 MG tablet Take 1 tablet (25 mg) by mouth daily 90 tablet 3     losartan (COZAAR) 100 MG tablet Take 1 tablet (100 mg) by mouth daily 90 tablet 3     metoprolol succinate ER (TOPROL XL) 100 MG 24 hr tablet TAKE 1 AND 1/2 TABLETS(150 MG) BY MOUTH DAILY Strength: 100 mg 135 tablet 3     nystatin (MYCOSTATIN) 220682 UNIT/GM external powder Apply topically 2 times daily as needed for other (rash) 60 g 3     Semaglutide-Weight Management (WEGOVY) 0.25 MG/0.5ML SOAJ Inject 0.25 mg Subcutaneous once a week for 28 days 4 pens. Will ramp up dose monthly if tolerating well to goal of 2.4mg/week eventually. 2 mL 0     Semaglutide-Weight Management (WEGOVY) 0.5 MG/0.5ML SOAJ Inject 0.5 mg Subcutaneous every 7 days for 28 days 4 pens 2 mL 0     Semaglutide-Weight Management (WEGOVY) 1 MG/0.5ML SOAJ Inject 1 mg Subcutaneous every 7 days for 28 days 4 pens 2 mL 0     Semaglutide-Weight Management (WEGOVY) 1.7 MG/0.75ML SOAJ Inject 1.7 mg Subcutaneous every 7 days for 28 days 4 pens 3 mL 0     Semaglutide-Weight Management (WEGOVY) 2.4 MG/0.75ML SOAJ Inject 2.4 mg Subcutaneous every 7 days for 270 days 4 pens 3 mL 9     spironolactone (ALDACTONE) 50 MG tablet Take 1 tablet (50 mg) by mouth daily 90 tablet 3       ALLERGIES:   Allergies   Allergen Reactions     Lisinopril Unknown     Annotation: cough       Oxycodone-Acetaminophen Unknown         TSH   Date Value Ref Range Status   07/27/2021 2.29 0.30 - 5.00 uIU/mL Final       PHYSICAL EXAM:  Objective     /74   Ht 1.575 m (5' 2\")   Wt 118.8 kg (262 lb)   BMI 47.92 kg/m    /74   Ht 1.575 m (5' 2\")   Wt 118.8 kg (262 lb)   BMI 47.92 kg/m    Body mass index is 47.92 kg/m .  Physical Exam   GENERAL: mid aged female with lipedema type distribution of body weight with central predominance.  NECK: no adenopathy, no asymmetry, masses, or scars and thyroid normal to palpation  RESP: lungs clear to auscultation - no rales, rhonchi " or wheezes  CV: regular rate and rhythm, normal S1 S2, no S3 or S4, no murmur, click or rub, no peripheral edema and peripheral pulses strong  ABDOMEN: soft, nontender, no hepatosplenomegaly, no masses and bowel sounds normal  MS: no gross musculoskeletal defects noted, no edema.no vascular congestion/rash noted in pretibial areas.        Sincerely,    Kel Dorado MD            Again, thank you for allowing me to participate in the care of your patient.        Sincerely,        Kel Dorado MD

## 2023-02-08 NOTE — PROGRESS NOTES
"New Medical Weight Management Consult    PATIENT:  Monae Martinez  MRN:         [id number]  :         1965  DAVEY:         2023        I had the pleasure of seeing your patient, Monae Martinez. Full intake/assessment was done to determine barriers to weight loss success and develop a treatment plan. Monae Martinez is a 57 year old female interested in treatment of medical problems associated with excess weight. She has a height of 5' 2\", a weight of 262 lbs 0 oz, and the calculated Body mass index is 47.92 kg/m .    Monae is a patient with mature onset class III, morbid obesity with significant element of familial/genetic influence and with current health consequences. She does need aggressive weight loss plan due to her hypertension, central adiposity/BMI of 37.9 with history of low HDL that meet criteria for metabolic syndrome and related vascular risks..  Monae Martinez eats a high carb diet, uses food as a reward and uses food as mood management.      Her problem is complicated by strong craving/reward pathways, a binge eating component, gender and short stature and impaired metabolic perception.  In the past, she's been frustrated when her exercise didn't translate into weight loss and we discussed the importance of finding good exercise but how the nutritional plan will determine weight loss when active.  We'll work to build up her fitness again as she identifies feeling her best when regularly active like most people. No current injuries to limit her activity.      Review of the patient's history and habits today suggest that weight gain has been long term struggle.  She's not interested in surgical weight loss program today but could consider if not finding success with medication supported dietary changes in the future. .    Previous Interventions found to be helpful in the past for weight loss include fitness focus helped her feel good but didn't translate to weight loss. .    We discussed a toolbox approach " to weight management today and she is open to combining dietary therapy with inreased mindfulness techniques, activity/exercise to produce weight reduction. Medication assistance for appetite control was discussed today and on review of the risks/benefits for this patients health history, we've decided to start with appetite suppressant therapy.    ASSESSMENT AND PLAN  Problem List Items Addressed This Visit        Digestive    Morbid obesity (H)    Relevant Medications    Semaglutide-Weight Management (WEGOVY) 0.25 MG/0.5ML SOAJ    Semaglutide-Weight Management (WEGOVY) 0.5 MG/0.5ML SOAJ    Semaglutide-Weight Management (WEGOVY) 1 MG/0.5ML SOAJ    Semaglutide-Weight Management (WEGOVY) 1.7 MG/0.75ML SOAJ    Semaglutide-Weight Management (WEGOVY) 2.4 MG/0.75ML SOAJ   Other Visit Diagnoses     Obesity, Class III, BMI 40-49.9 (morbid obesity) (H)    -  Primary    Relevant Medications    Semaglutide-Weight Management (WEGOVY) 0.25 MG/0.5ML SOAJ    Semaglutide-Weight Management (WEGOVY) 0.5 MG/0.5ML SOAJ    Semaglutide-Weight Management (WEGOVY) 1 MG/0.5ML SOAJ    Semaglutide-Weight Management (WEGOVY) 1.7 MG/0.75ML SOAJ    Semaglutide-Weight Management (WEGOVY) 2.4 MG/0.75ML SOAJ    Other Relevant Orders    Comprehensive metabolic panel    Hemoglobin A1c (Completed)    CBC with platelets (Completed)    TSH    25- OH-Vitamin D    Parathyroid Hormone Intact    Cortisol    Metabolic syndrome X               Plan:  1. Aim for mindful protein and vegetable rich meals with good hydration as has worked well for you in the past. Focus on 22-28 grams of protein per meal, every 4.5-6 hours with plans for a snack if needed to bridge to the next meal, still feeling in control. Considering a protein supplement if needed to hit your 65-85grams per day of protein.    2. Aim for 70-85 oz per day of water.    3. Recommend tracking nahun like [employer].    4. If covered affordably, Wegovy can start on a Thursdays/Friday and once weekly  with ramping up over the next 4 months to an eventual 2.4mg/week if tolerated. Stop if rash/throat swelling or severe upset stomach/nausea/vomiting or severe pain.  Check out [employer] web site for coupon information.     5. Check labs today.    Addendum: 2/8/2023 4:16 PM  Patient's A1c returns at borderline diabetic levels at 6.4%, further cementing her metabolic syndrome.  Semaglutide therapy should offer dual benefit on appetite reduction, insulin balance/glycemic help such that with as little as  8% weight reduction we may start to positively improve her metabolic/vascular risks and liver health.   Lab Results   Component Value Date    A1C 6.4 02/08/2023       60 minutes spent on the date of the encounter doing chart review, history and exam, documentation and further activities per the note        She has the following co-morbidities:       2/7/2023   I have the following health issues associated with obesity: Pre-Diabetes, High Blood Pressure, Polycystic Ovarian Syndrome   I have the following symptoms associated with obesity: Knee Pain, Depression, Lower Extremity Swelling, Back Pain, Fatigue, Hip Pain   hx of uterine cancer and s/p hysterectomy: ovaries are intact. ? Perimenopausal. No hot flashes.  Graduated at 136 lbs. 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 Tried some Optifast type diets in the past without much success.  Tried intermittent fasting and self styled Keto.     She struggles mostly with simple carbs: potatoes, rice,bread tends to be the difficulty for her.   Feels she's ready for help here today.   In the past works out at gym in [place], yoga 1-2x weekly in class and on her own.   Low back/knee pain and stress work.  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  Sleeps well but snores. Worse at heavier weights.     Patient Goals 2/7/2023   I am interested in having a healthier weight to diminish current health problems: Yes  "  I am interested in having a healthier weight in order to prevent future health problems: Yes   I am interested in having a healthier weight in order to have a future surgery: No   stressors:  last year.    Referring Provider 2/7/2023   Please name the provider who referred you to Medical Weight Management.  If you do not know, please answer: \"I Don't Know\". Mattie VERONICA   discussed desire for weight management w/ her PCP on 1/11/23 visit on chart review and was referred here.    Weight History 2/7/2023   How concerned are you about your weight? Very Concerned   Would you describe your weight gain as gradual? No   I became overweight: As a Child   The following factors have contributed to my weight gain:  Mental Health Issues, Change in Schedule, Lack of Exercise, Genetic (Runs in the Family), Stress   I have tried the following methods to lose weight: Watching Portions or Calories, Exercise, Weight Watchers, Atkins-type Diet (Low Carb/High Protein), Nutrisystem, Meal Replacements, Fasting   My lowest weight since age 19 was: 135   My highest weight since age 19 was: 299   The most weight I have ever lost was: (lbs) 35   I have the following family history of obesity/being overweight:  My mother is overweight   Has anyone in your family had weight loss surgery? No   How has your weight changed over the last year?  Gained   How many pounds? 15   When losing weight in the past, she found 2 years ago when intermittent fasting and mindful diet was most helpful. Starting eating 1pm, high quality food, daytime veg snack through the day/bell peppers/tomatoes and then supper 6-7pm. Bed by 10pm. Was drinking 96oz of water. After meeting a jaime (retired ), feels Int Fasting no longer fits her life.  In the past tried phentermine ?fenfluramine in the 1990s and had excess thirst and palpitations and didn't lose weight.  Diet Recall Review with Patient 2/7/2023   Do you typically eat breakfast? No   If you do eat " breakfast, what types of food do you eat? Eggs toast avocados omlete nazario sausage   Do you typically eat lunch? No   If you do eat lunch, what types of food do you typically eat?  Tuna grilled cheese   Do you typically eat supper? Yes   If you do eat supper, what types of food do you typically eat? Chicken Shrimp Steak Pork Veggies Rice Pasta   Do you typically eat snacks? Yes   If you do snack, what types of food do you typically eat? Cucumbers Chocolate Almonds Sweet Peppers Popcorn   Do you like vegetables?  Yes   Do you drink water? Yes   How many glasses of juice do you drink in a typical day? 0   How many of glasses of milk do you drink in a typical day? 1   If you do drink milk, what type? Skim   How many 8oz glasses of sugar containing drinks such as Carlos-Aid/sweet tea do you drink in a day? 0   How many cans/bottles of sugar pop/soda/tea/sports drinks do you drink in a day? 0   How many cans/bottles of diet pop/soda/tea or sports drink do you drink in a day? 0   How often do you have a drink of alcohol? 2-3 TImes a Week   If you do drink, how many drinks might you have in a day? 1 or 2       Eating Habits 2/7/2023   Generally, my meals include foods like these: bread, pasta, rice, potatoes, corn, crackers, sweet dessert, pop, or juice. Once a Week   Generally, my meals include foods like these: fried meats, brats, burgers, french fries, pizza, cheese, chips, or ice cream. Less Than Weekly   Eat fast food (like McDonal[employer], [employer], Taco Bell). Less Than Weekly   Eat at a buffet or sit-down restaurant. Less Than Weekly   Eat most of my meals in front of the TV or computer. Once a Week   Often skip meals, eat at random times, have no regular eating times. A Few Times a Week   Rarely sit down for a meal but snack or graze throughout.  Less Than Weekly   Eat extra snacks between meals. Less Than Weekly   Eat most of my food at the end of the day. Almost Everyday   Eat in the middle of the night or wake up at  night to eat. Never   Eat extra snacks to prevent or correct low blood sugar. Never   Eat to prevent acid reflux or stomach pain. Never   Worry about not having enough food to eat. Never   Have you been to the food shelf at least a few times this year? No   I eat when I am depressed. A Few Times a Week   I eat when I am stressed. A Few Times a Week   I eat when I am bored. A Few Times a Week   I eat when I am anxious. A Few Times a Week   I eat when I am happy or as a reward. A Few Times a Week   I feel hungry all the time even if I just have eaten. A Few Times a Week   Feeling full is important to me. A Few Times a Week   I finish all the food on my plate even if I am already full. Once a Week   I can't resist eating delicious food or walk past the good food/smell. Everyday   I eat/snack without noticing that I am eating. Never   I eat when I am preparing the meal. Everyday   I eat more than usual when I see others eating. A Few Times a Week   I have trouble not eating sweets, ice cream, cookies, or chips if they are around the house. Less Than Weekly   I think about food all day. Everyday   What foods, if any, do you crave? Cheese   Please list any other foods you crave? Carbs Potatoes Rice Noodles       Amount of Food 2/7/2023   I make myself vomit what I have eaten or use laxatives to get rid of food. Never   I eat a large amount of food, like a loaf of bread, a box of cookies, a pint/quart of ice cream, all at once. Never   I eat a large amount of food even when I am not hungry. Weekly   I eat rapidly. Everyday   I eat alone because I feel embarrassed and do not want others to see how much I have eaten. Never   I eat until I am uncomfortably full. Never   I feel bad, disgusted, or guilty after I overeat. Everyday   I make myself vomit what I have eaten or use laxatives to get rid of food. Never       Activity/Exercise History 2/7/2023   How much of a typical 12 hour day do you spend sitting? Half the Day   How  much of a typical 12 hour day do you spend lying down? Less Than Half the Day   How much of a typical day do you spend walking/standing? Half the Day   How many hours (not including work) do you spend on the TV/Video Games/Computer/Tablet/Phone? 2-3 Hours   How many times a week are you active for the purpose of exercise? 2-3 Times a Week   What keeps you from being more active? Lack of Time, Too tired   How many total minutes do you spend doing some activity for the purpose of exercising when you exercise? More Than 30 Minutes       PAST MEDICAL HISTORY:  Past Medical History:   Diagnosis Date     Hypertension        Work/Social History Reviewed With Patient 2/7/2023   My employment status is: Full-Time   My job is: Hospice Nurse   How much of your job is spent on the computer or phone? 50%   How many hours do you spend commuting to work daily?  Na   What is your marital status? Single   If in a relationship, is your significant other overweight? No   Do you have children? Yes   If you have children, are they overweight? Yes   Who do you live with?  Myself   Are they supportive of your health goals? Yes   Who does the food shopping?  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    Mental Health History Reviewed With Patient 2/7/2023   Have you ever been physically or sexually abused? No   If yes, do you feel that the abuse is affecting your weight? N/A   If yes, would you like to talk to a counselor about the abuse? N/A   How often in the past 2 weeks have you felt little interest or pleasure in doing things? Not at all   Over the past 2 weeks how often have you felt down, depressed, or hopeless? Not at all       Sleep History Reviewed With Patient 2/7/2023   How many hours do you sleep at night? 7   Do you think that you snore loudly or has anybody ever heard you snore loudly (louder than talking or so loud it can be heard behind a shut door)? Yes   Has anyone  seen or heard you stop breathing during your sleep? No   Do you often feel tired, fatigued, or sleepy during the day? Yes   Do you have a TV/Computer in your bedroom? No   ESS of 1.  STOPBANG of 4  Widely patent mallampati I airway..    Past Surgical History:   Procedure Laterality Date     BREAST CYST EXCISION Right      HC REMOVAL OF TONSILS,<14 Y/O      Description: Tonsillectomy;  Recorded: 10/28/2008;     HYSTERECTOMY  2007     [facility]  DELIVERY ONLY      Description:  Section;  Recorded: 10/28/2008;     [facility] TOTAL ABDOM HYSTERECTOMY      Description: Total Abdominal Hysterectomy;  Recorded: 2009;  Comments: BSO       Social History     Socioeconomic History     Marital status:      Spouse name: Not on file     Number of children: Not on file     Years of education: Not on file     Highest education level: Not on file   Occupational History     Not on file   Tobacco Use     Smoking status: Never     Smokeless tobacco: Never   Substance and Sexual Activity     Alcohol use: No     Comment: Alcoholic Drinks/day: once a month     Drug use: No     Sexual activity: Yes     Partners: Male     Birth control/protection: Surgical   Other Topics Concern     Not on file   Social History Narrative     Not on file     Social Determinants of Health     Financial Resource Strain: Not on file   Food Insecurity: Not on file   Transportation Needs: Not on file   Physical Activity: Not on file   Stress: Not on file   Social Connections: Not on file   Intimate Partner Violence: Not on file   Housing Stability: Not on file       MEDICATIONS:   Current Outpatient Medications   Medication Sig Dispense Refill     cholecalciferol (VITAMIN D3) 125 mcg (5000 units) capsule Take by mouth daily       cyclobenzaprine (FLEXERIL) 5 MG tablet Take 1 tablet (5 mg) by mouth every 8 hours as needed 90 tablet 1     escitalopram (LEXAPRO) 10 MG tablet Take 1 tablet (10 mg) by mouth daily 90 tablet 3     fluticasone  "propionate (FLONASE) 50 mcg/actuation nasal spray [FLUTICASONE PROPIONATE (FLONASE) 50 MCG/ACTUATION NASAL SPRAY] Apply 1 spray into each nostril daily. 16 g 6     hydrochlorothiazide (HYDRODIURIL) 25 MG tablet Take 1 tablet (25 mg) by mouth daily 90 tablet 3     losartan (COZAAR) 100 MG tablet Take 1 tablet (100 mg) by mouth daily 90 tablet 3     metoprolol succinate ER (TOPROL XL) 100 MG 24 hr tablet TAKE 1 AND 1/2 TABLETS(150 MG) BY MOUTH DAILY Strength: 100 mg 135 tablet 3     nystatin (MYCOSTATIN) 950644 UNIT/GM external powder Apply topically 2 times daily as needed for other (rash) 60 g 3     Semaglutide-Weight Management (WEGOVY) 0.25 MG/0.5ML SOAJ Inject 0.25 mg Subcutaneous once a week for 28 days 4 pens. Will ramp up dose monthly if tolerating well to goal of 2.4mg/week eventually. 2 mL 0     Semaglutide-Weight Management (WEGOVY) 0.5 MG/0.5ML SOAJ Inject 0.5 mg Subcutaneous every 7 days for 28 days 4 pens 2 mL 0     Semaglutide-Weight Management (WEGOVY) 1 MG/0.5ML SOAJ Inject 1 mg Subcutaneous every 7 days for 28 days 4 pens 2 mL 0     Semaglutide-Weight Management (WEGOVY) 1.7 MG/0.75ML SOAJ Inject 1.7 mg Subcutaneous every 7 days for 28 days 4 pens 3 mL 0     Semaglutide-Weight Management (WEGOVY) 2.4 MG/0.75ML SOAJ Inject 2.4 mg Subcutaneous every 7 days for 270 days 4 pens 3 mL 9     spironolactone (ALDACTONE) 50 MG tablet Take 1 tablet (50 mg) by mouth daily 90 tablet 3       ALLERGIES:   Allergies   Allergen Reactions     Lisinopril Unknown     Annotation: cough       Oxycodone-Acetaminophen Unknown         TSH   Date Value Ref Range Status   07/27/2021 2.29 0.30 - 5.00 uIU/mL Final       PHYSICAL EXAM:  Objective    /74   Ht 1.575 m (5' 2\")   Wt 118.8 kg (262 lb)   BMI 47.92 kg/m    /74   Ht 1.575 m (5' 2\")   Wt 118.8 kg (262 lb)   BMI 47.92 kg/m    Body mass index is 47.92 kg/m .  Physical Exam   GENERAL: mid aged female with lipedema type distribution of body weight with " central predominance.  NECK: no adenopathy, no asymmetry, masses, or scars and thyroid normal to palpation  RESP: lungs clear to auscultation - no rales, rhonchi or wheezes  CV: regular rate and rhythm, normal S1 S2, no S3 or S4, no murmur, click or rub, no peripheral edema and peripheral pulses strong  ABDOMEN: soft, nontender, no hepatosplenomegaly, no masses and bowel sounds normal  MS: no gross musculoskeletal defects noted, no edema.no vascular congestion/rash noted in pretibial areas.        Sincerely,    Kel Dorado MD

## 2023-02-09 LAB — DEPRECATED CALCIDIOL+CALCIFEROL SERPL-MC: 29 UG/L (ref 20–75)

## 2023-02-15 ENCOUNTER — VIRTUAL VISIT (OUTPATIENT)
Dept: SURGERY | Facility: CLINIC | Age: 58
End: 2023-02-15
Payer: COMMERCIAL

## 2023-02-15 DIAGNOSIS — E66.01 MORBID OBESITY WITH BMI OF 40.0-44.9, ADULT (H): Primary | ICD-10-CM

## 2023-02-15 DIAGNOSIS — R73.03 PREDIABETES: ICD-10-CM

## 2023-02-15 DIAGNOSIS — R73.03 BORDERLINE DIABETES: Primary | ICD-10-CM

## 2023-02-15 DIAGNOSIS — E88.810 METABOLIC SYNDROME X: ICD-10-CM

## 2023-02-15 PROCEDURE — 97802 MEDICAL NUTRITION INDIV IN: CPT | Mod: VID

## 2023-02-15 RX ORDER — DULAGLUTIDE 1.5 MG/.5ML
1.5 INJECTION, SOLUTION SUBCUTANEOUS
Qty: 6 ML | Refills: 1 | Status: SHIPPED | OUTPATIENT
Start: 2023-03-15 | End: 2023-02-24

## 2023-02-15 RX ORDER — DULAGLUTIDE 0.75 MG/.5ML
0.75 INJECTION, SOLUTION SUBCUTANEOUS
Qty: 2 ML | Refills: 0 | Status: SHIPPED | OUTPATIENT
Start: 2023-02-15 | End: 2023-02-24

## 2023-02-15 NOTE — PROGRESS NOTES
Her insurance doesn't cover appetite suppressants so Wegovy ramping removed from med list.  Borderline diabetes on intake labs w/ A1c of 6.4% c/w her metabolic syndrome of hypertension/central adiposity/insulin resistance/low HDL (48). We'll ramp up Trulicity to 1.5m/week and consider additional agents based on her response with goal weight reduction of at least 5-10% this year to improve metabolic risks.  Kel Dorado MD

## 2023-02-15 NOTE — LETTER
2/15/2023         RE: Monae Martinez  [address]  [place] [address]        Dear Colleague,    Thank you for referring your patient, Monae Martinez, to the [facility] [place]. Please see a copy of my visit note below.    Monae Martinez is a 57 year old who is being evaluated via a billable video visit.      How would you like to obtain your AVS? MyChart  If the video visit is dropped, the invitation should be resent by: Text to cell phone: [phone]  Will anyone else be joining your video visit? No          Medical Weight Loss Initial Diet Evaluation; 24 week program  RD visit 1/6  Assessment:  Monae is presenting today for a new weight management nutrition consultation. Pt has had an initial appointment with Dr. Dorado.  Weight loss medication: Wegovy. - recently found out medication is not covered.    Personal Goals: Overall weight loss for health.      Anthropometrics:    Pt's weight is 260.1 lbs  Initial weight: 262 lbs  Weight change: down   BMI: 47.5 kg/m2  Ideal body weight: 50.1 kg (110 lb 7.2 oz)  Adjusted ideal body weight: 77.6 kg (171 lb 1.1 oz)  Estimated RMR ([place]-St Jeor equation):  1720 kcals x 1.2 (sedentary) = 2070 kcals (for weight maintenance)    Recommended Protein Intake: 60-80 grams of protein/day    Medical History:  Patient Active Problem List   Diagnosis     Obesity     Depression     Hypertension     Anemia     Major Depression, Recurrent     Vitamin D Deficiency     Prediabetes     Morbid obesity (H)      Diabetes: No  HbA1c:  6.4 (2/8/23) prediabetes   Nutrition History:   Food allergies/intolerances/cultural or religous food customs: No intolerance with excessive dairy (ice cream and milk)    Weight loss history: Tried dieting the past with little success. Has tried intermittent fasting and keto type diets. Feels like she eats overall healthy. Has hx with finding comfort with food and high carbohydrate diet. States when she is  overly hungry and looks to get something to eat she doesn't necessarily pay close attention to what she chooses.     Vitamins/Mineral Supplementation: vit D and     Dietary Recall:  Breakfast: Eggs, toast, avocados, omlet, nazario sausage  Lunch: typically skips, will have Tuna or salad or grilled cheese  Dinner: Chicken Shrimp, Steak, Pork, Veggies, Rice, Pasta,  Typical Snacks: Cucumbers, Chocolate, Almonds, Sweet Peppers, Popcorn  Overnight eating: No  Eating out: 2x per week     Beverages: 3-4 24 oz water, 1c coffee with hazelnut creamer. Occasionally 1% milk (rarely)    Exercise: doing yoga, walking. Is a member at a gym in [place].     Nutrition Diagnosis (PES statement):   Overweight/Obesity (NC 3.3) related to overeating and poor lifestyle habits as evidenced by patient report of excessive carbohydrate intake, knowledge defect on portion sizes and BMI 47.5 kg/m2      Nutrition Intervention  1. Food and/or Nutrient Delivery   a. Placed emphasis on importance of developing a healthy meal routine, aiming for 3 meals a day and no snacks.  2. Nutrition Education   a. Discussed with patient how to build a meal: the importance of including a lean/low fat protein at each meal, include a source of vegetables at a minimum of lunch and dinner and limiting carbohydrate intake   b. Educated on sources of lean protein, portion sizes, the amount of grams found in each source. Recommend patient to aim for 20-30g protein at each meal.  3. Nutrition Counseling   a. Encouraged importance of developing routine exercise for health benefits and weight loss.  b. Discussed mindful eating techniques such as eating off smaller places/bowls, taking 20-30 minutes to eat in a calm/relaxed environment without distractions.      Goals established by patient:   1. Eating off of smaller plate for portion control  2. Waiting at least 10 min before getting second helpings to recognize satiety  3. Aim for 20-25g PRO at each meal.    Handouts  provided:  Breakfast ideas.   My plate  Protein sources    Assessment/Plan:    Pt will follow up in 2 month(s) with bariatrician and 1 month(s) with dietitian.       Video-Visit Details    Type of service:  Video Visit    Video Start Time (time video started): 3:53 pm    Video End Time (time video stopped): 4:34 pm    Originating Location (pt. Location): Home        Distant Location (provider location):  Off-site    Mode of Communication:  Video Conference via [facility]    Physician has received verbal consent for a Video Visit from the patient? Yes      Bibiana Gaitan RD          Again, thank you for allowing me to participate in the care of your patient.        Sincerely,        Bibiana Gaitan RD

## 2023-02-15 NOTE — PROGRESS NOTES
Monae Martinez is a 57 year old who is being evaluated via a billable video visit.      How would you like to obtain your AVS? MyChart  If the video visit is dropped, the invitation should be resent by: Text to cell phone: [phone]  Will anyone else be joining your video visit? No          Medical Weight Loss Initial Diet Evaluation; 24 week program  RD visit 1/6  Assessment:  Monae is presenting today for a new weight management nutrition consultation. Pt has had an initial appointment with Dr. Dorado.  Weight loss medication: Wegovy. - recently found out medication is not covered.    Personal Goals: Overall weight loss for health.      Anthropometrics:    Pt's weight is 260.1 lbs  Initial weight: 262 lbs  Weight change: down   BMI: 47.5 kg/m2  Ideal body weight: 50.1 kg (110 lb 7.2 oz)  Adjusted ideal body weight: 77.6 kg (171 lb 1.1 oz)  Estimated RMR ([place]-St Jeor equation):  1720 kcals x 1.2 (sedentary) = 2070 kcals (for weight maintenance)    Recommended Protein Intake: 60-80 grams of protein/day    Medical History:  Patient Active Problem List   Diagnosis     Obesity     Depression     Hypertension     Anemia     Major Depression, Recurrent     Vitamin D Deficiency     Prediabetes     Morbid obesity (H)      Diabetes: No  HbA1c:  6.4 (2/8/23) prediabetes   Nutrition History:   Food allergies/intolerances/cultural or religous food customs: No intolerance with excessive dairy (ice cream and milk)    Weight loss history: Tried dieting the past with little success. Has tried intermittent fasting and keto type diets. Feels like she eats overall healthy. Has hx with finding comfort with food and high carbohydrate diet. States when she is overly hungry and looks to get something to eat she doesn't necessarily pay close attention to what she chooses.     Vitamins/Mineral Supplementation: vit D and     Dietary Recall:  Breakfast: Eggs, toast, avocados, omlet, nazario sausage  Lunch: typically skips, will have Tuna  or salad or grilled cheese  Dinner: Chicken Shrimp, Steak, Pork, Veggies, Rice, Pasta,  Typical Snacks: Cucumbers, Chocolate, Almonds, Sweet Peppers, Popcorn  Overnight eating: No  Eating out: 2x per week     Beverages: 3-4 24 oz water, 1c coffee with hazelnut creamer. Occasionally 1% milk (rarely)    Exercise: doing yoga, walking. Is a member at a gym in [place].     Nutrition Diagnosis (PES statement):   Overweight/Obesity (NC 3.3) related to overeating and poor lifestyle habits as evidenced by patient report of excessive carbohydrate intake, knowledge defect on portion sizes and BMI 47.5 kg/m2      Nutrition Intervention  1. Food and/or Nutrient Delivery   a. Placed emphasis on importance of developing a healthy meal routine, aiming for 3 meals a day and no snacks.  2. Nutrition Education   a. Discussed with patient how to build a meal: the importance of including a lean/low fat protein at each meal, include a source of vegetables at a minimum of lunch and dinner and limiting carbohydrate intake   b. Educated on sources of lean protein, portion sizes, the amount of grams found in each source. Recommend patient to aim for 20-30g protein at each meal.  3. Nutrition Counseling   a. Encouraged importance of developing routine exercise for health benefits and weight loss.  b. Discussed mindful eating techniques such as eating off smaller places/bowls, taking 20-30 minutes to eat in a calm/relaxed environment without distractions.      Goals established by patient:   1. Eating off of smaller plate for portion control  2. Waiting at least 10 min before getting second helpings to recognize satiety  3. Aim for 20-25g PRO at each meal.    Handouts provided:  Breakfast ideas.   My plate  Protein sources    Assessment/Plan:    Pt will follow up in 2 month(s) with bariatrician and 1 month(s) with dietitian.       Video-Visit Details    Type of service:  Video Visit    Video Start Time (time video started): 3:53 pm    Video  End Time (time video stopped): 4:34 pm    Originating Location (pt. Location): Home        Distant Location (provider location):  Off-site    Mode of Communication:  Video Conference via [facility]    Physician has received verbal consent for a Video Visit from the patient? Yes      Bibiana Gaitan RD

## 2023-02-20 ENCOUNTER — TELEPHONE (OUTPATIENT)
Dept: SURGERY | Facility: CLINIC | Age: 58
End: 2023-02-20
Payer: COMMERCIAL

## 2023-02-20 NOTE — TELEPHONE ENCOUNTER
Prior Authorization Retail Medication Request    Medication/Dose: Trulicity 0.75mg injectors weekly for 28 days and then increase to 1.5mg weekly if tolerated    Rationale: Her insurance doesn't cover appetite suppressants so Wegovy ruthing removed from med list.  Borderline diabetes on intake labs w/ A1c of 6.4% c/w her metabolic syndrome of hypertension/central adiposity/insulin resistance/low HDL (48). We'll ramp up Trulicity to 1.5m/week and consider additional agents based on her response with goal weight reduction of at least 5-10% this year to improve metabolic risks.  Kel Dorado MD    Key for 0.75mg: [id number]  Key for 1.5mg: [id number]    Pharmacy Information (if different than what is on RX)  Name:  [facility]  Phone:  [phone]

## 2023-02-21 ENCOUNTER — TELEPHONE (OUTPATIENT)
Dept: FAMILY MEDICINE | Facility: CLINIC | Age: 58
End: 2023-02-21

## 2023-02-21 NOTE — TELEPHONE ENCOUNTER
Call attempt x2 for initial health and wellness coaching appointment as part of the 24-week Healthy Lifestyle Plan.    Sent email and [email or web address] message to offer assistance in getting the appt rescheduled if patient is still interested in beginning the 24-week Healthy Lifestyle Plan.    Also sending patient information on everything that is included in the price of $499.    May call the main scheduling line for assistance as well:  [phone].     SIGNATURE:  QUIANA Ying, [facility]  National Board-Certified Health &   24-Week Healthy Lifestyle Plan Health   [facility]  email:  [email or web address]  appointment [phone]

## 2023-02-23 NOTE — TELEPHONE ENCOUNTER
Central Prior Authorization Team   Phone: [phone]    PA Initiation    Medication: Trulicity 0.75mg injectors  Insurance Company: [employer] [place] - Phone [phone] Fax [phone]  Pharmacy Filling the Rx: [id number] - [place], [place] - [address] AT [facility] [address]  Filling Pharmacy Phone: [phone]  Filling Pharmacy Fax:    Start Date: 2/23/2023

## 2023-02-24 ENCOUNTER — MYC MEDICAL ADVICE (OUTPATIENT)
Dept: SURGERY | Facility: CLINIC | Age: 58
End: 2023-02-24
Payer: COMMERCIAL

## 2023-02-24 DIAGNOSIS — E88.810 METABOLIC SYNDROME X: Primary | ICD-10-CM

## 2023-02-24 NOTE — TELEPHONE ENCOUNTER
PRIOR AUTHORIZATION DENIED    Medication: Trulicity 0.75mg injectors    Denial Date: 2/24/2023    Denial Rational: Medication is only covered if being prescribed for Type 2 Diabetes.  It is not covered for any other diagnosis.        Appeal Information: Review the plan's reasons for denial listed above. Please utilize that information to complete letter and provide specific, detailed clinical information/rationale of your patient's health status to address their denial reasons.

## 2023-02-24 NOTE — PROGRESS NOTES
Trulicity not covered by her insurance for borderline diabetes. Will start metformin/low dose phentermine (8mg Lomaira with lunch and/or supper) instead if tolerated.  Kel Dorado MD

## 2023-02-27 ENCOUNTER — TELEPHONE (OUTPATIENT)
Dept: SURGERY | Facility: CLINIC | Age: 58
End: 2023-02-27
Payer: COMMERCIAL

## 2023-02-28 NOTE — TELEPHONE ENCOUNTER
PRIOR AUTHORIZATION DENIED    Medication: phentermine (LOMAIRA) 8 MG tablet - EPA DENIAL    Denial Date: 2/26/2023    Denial Rational:       Appeal Information:

## 2023-03-22 ENCOUNTER — TELEPHONE (OUTPATIENT)
Dept: SURGERY | Facility: CLINIC | Age: 58
End: 2023-03-22

## 2023-03-22 NOTE — TELEPHONE ENCOUNTER
Sent patient a text message video link to connect. Called patient when she was not connected for the video nutrition visit. Left voicemail instructing patient to connect or to call the clinic (phone number provided) to reschedule appointment.    Bibiana Gaitan RD, LD

## 2023-05-12 DIAGNOSIS — E88.810 METABOLIC SYNDROME X: ICD-10-CM

## 2023-07-18 NOTE — PROGRESS NOTES
"Monae Martinez is a 54 y.o. female who is being evaluated via a billable video visit.      The patient has been notified of following:     \"This video visit will be conducted via a call between you and your physician/provider. We have found that certain health care needs can be provided without the need for an in-person physical exam.  This service lets us provide the care you need with a video conversation.  If a prescription is necessary we can send it directly to your pharmacy.  If lab work is needed we can place an order for that and you can then stop by our lab to have the test done at a later time.    Video visits are billed at different rates depending on your insurance coverage. Please reach out to your insurance provider with any questions.    If during the course of the call the physician/provider feels a video visit is not appropriate, you will not be charged for this service.\"    Patient has given verbal consent to a Video visit? Yes    Will anyone else be joining your video visit? No        Video Start Time: 8:40 AM      Office Visit - Follow Up   Monae Martinez   54 y.o. female    Date of Visit: 6/17/2020    Chief Complaint   Patient presents with     Follow-up     Hypertension        Assessment and Plan   1. Depressive disorder  Stable symptoms. Continue current medication and plan to check TSH and vitamin D levels.  - Thyroid Stimulating Hormone (TSH); Future  - Vitamin D, Total (25-Hydroxy); Future  - escitalopram oxalate (LEXAPRO) 10 MG tablet; Take 1 tablet (10 mg total) by mouth daily.  Dispense: 90 tablet; Refill: 3    2. Hypertension, unspecified type  Stable hypertension. Continue current medication and plan to check CMP and lipid panel.  - Lipid [place] FASTING; Future  - Comprehensive Metabolic Panel; Future  - hydroCHLOROthiazide (HYDRODIURIL) 25 MG tablet; Take 1 tablet (25 mg total) by mouth daily.  Dispense: 90 tablet; Refill: 3  - losartan (COZAAR) 100 MG tablet; Take 1 tablet (100 mg total) " by mouth daily.  Dispense: 90 tablet; Refill: 3  - metoprolol succinate (TOPROL-XL) 100 MG 24 hr tablet; Take 1.5 tablets (150 mg total) by mouth daily.  Dispense: 135 tablet; Refill: 3  - spironolactone (ALDACTONE) 50 MG tablet; Take 1 tablet (50 mg total) by mouth daily.  Dispense: 90 tablet; Refill: 3    3. Muscle ache  Likely secondary physical exercise. Muscle relaxants have worked in the past. Use as prescribed  - cyclobenzaprine (FLEXERIL) 5 MG tablet; Take 1 tablet (5 mg total) by mouth every 8 (eight) hours as needed for muscle spasms.  Dispense: 30 tablet; Refill: 1    4. Visit for screening mammogram  Discussed need for screening  - Mammo Screening Bilateral; Future    5. Fungal infection  Discussed diagnosis and treatment plan.   - nystatin (MYCOSTATIN) powder; Apply to affected area 3 times daily  Dispense: 15 g; Refill: 0    The following high BMI interventions were performed this visit: encouragement to exercise and dietary management education, guidance, and counseling    No follow-ups on file.     History of Present Illness   This 54 y.o. old female patient with history of obesity, depression, hypertension, anemia, vitamin D deficiency, and major depression disorder.  Patient presents to the clinic via video visit stating that she has been doing very well amidst of the pandemic.  She states that she has lost some weight and her blood pressure has been stable.  She reports that her last blood pressure was 123/77.  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  She stated that she has been working out a lot and using the elliptical but she does also complain of muscle aches after working out.  She stated that she has been using Flexeril as needed and she would like to have a prescription for Flexeril.  Patient also reported that she does have a rash and moisture within her breast in the past that she has used nystatin powder which did helped.  Patient would  like to have a prescription for that.  Patient denied any chest pain, shortness of breath, fever, chills and syncope.  Patient denied any suicidal or homicidal ideations.  Patient has no other concerns today.    Review of Systems: A comprehensive review of systems was negative except as noted.     Medications, Allergies and Problem List   Reviewed and updated     Physical Exam   General Appearance: Well groomed    There were no vitals taken for this visit.       Additional Information   Current Outpatient Medications   Medication Sig Dispense Refill     escitalopram oxalate (LEXAPRO) 10 MG tablet Take 1 tablet (10 mg total) by mouth daily. 90 tablet 3     fluticasone propionate (FLONASE) 50 mcg/actuation nasal spray Apply 1 spray into each nostril daily. 16 g 6     hydroCHLOROthiazide (HYDRODIURIL) 25 MG tablet Take 1 tablet (25 mg total) by mouth daily. 90 tablet 3     losartan (COZAAR) 100 MG tablet Take 1 tablet (100 mg total) by mouth daily. 90 tablet 3     metoprolol succinate (TOPROL-XL) 100 MG 24 hr tablet Take 1.5 tablets (150 mg total) by mouth daily. 135 tablet 3     spironolactone (ALDACTONE) 50 MG tablet Take 1 tablet (50 mg total) by mouth daily. 90 tablet 3     cyclobenzaprine (FLEXERIL) 5 MG tablet Take 1 tablet (5 mg total) by mouth every 8 (eight) hours as needed for muscle spasms. 30 tablet 1     nystatin (MYCOSTATIN) powder Apply to affected area 3 times daily 15 g 0     No current facility-administered medications for this visit.      Allergies   Allergen Reactions     Lisinopril      Annotation: cough       Oxycodone-Acetaminophen      Social History     Tobacco Use     Smoking status: Never Smoker     Smokeless tobacco: Never Used   Substance Use Topics     Alcohol use: No     Comment: once a month     Drug use: No            Additional provider notes: GENERAL: Healthy, alert and no distress  EYES: Eyes grossly normal to inspection. No discharge or erythema, or obvious scleral/conjunctival  abnormalities.  RESP: No audible wheeze, cough, or visible cyanosis.  No visible retractions or increased work of breathing.    NEURO: Cranial nerves grossly intact. Mentation and speech appropriate for age.  PSYCH: Mentation appears normal, affect normal/bright, judgement and insight intact, normal speech and appearance well-groomed      Video-Visit Details    Type of service:  Video Visit    Video End Time (time video stopped): 9:02 AM  Originating Location (pt. Location): Home    Distant Location (provider location):  [facility]     Platform used for Video Visit: ZULEIKA Chin     no

## 2024-02-22 DIAGNOSIS — I10 HYPERTENSION, UNSPECIFIED TYPE: ICD-10-CM

## 2024-02-23 RX ORDER — SPIRONOLACTONE 50 MG/1
50 TABLET, FILM COATED ORAL DAILY
Qty: 90 TABLET | Refills: 0 | Status: SHIPPED | OUTPATIENT
Start: 2024-02-23 | End: 2024-05-17

## 2024-02-23 RX ORDER — LOSARTAN POTASSIUM 100 MG/1
100 TABLET ORAL DAILY
Qty: 90 TABLET | Refills: 0 | Status: SHIPPED | OUTPATIENT
Start: 2024-02-23 | End: 2024-05-17

## 2024-02-27 DIAGNOSIS — F32.A DEPRESSIVE DISORDER: ICD-10-CM

## 2024-02-27 DIAGNOSIS — I10 HYPERTENSION, UNSPECIFIED TYPE: ICD-10-CM

## 2024-02-27 RX ORDER — HYDROCHLOROTHIAZIDE 25 MG/1
25 TABLET ORAL DAILY
Qty: 90 TABLET | Refills: 0 | Status: SHIPPED | OUTPATIENT
Start: 2024-02-27 | End: 2024-05-17

## 2024-02-27 RX ORDER — ESCITALOPRAM OXALATE 10 MG/1
10 TABLET ORAL DAILY
Qty: 90 TABLET | Refills: 0 | Status: SHIPPED | OUTPATIENT
Start: 2024-02-27 | End: 2024-05-17

## 2024-03-02 ENCOUNTER — HEALTH MAINTENANCE LETTER (OUTPATIENT)
Age: 59
End: 2024-03-02

## 2024-03-05 ENCOUNTER — OFFICE VISIT (OUTPATIENT)
Dept: FAMILY MEDICINE | Facility: CLINIC | Age: 59
End: 2024-03-05
Payer: COMMERCIAL

## 2024-03-05 VITALS
BODY MASS INDEX: 47.48 KG/M2 | WEIGHT: 258 LBS | OXYGEN SATURATION: 98 % | HEART RATE: 62 BPM | DIASTOLIC BLOOD PRESSURE: 82 MMHG | SYSTOLIC BLOOD PRESSURE: 126 MMHG | HEIGHT: 62 IN

## 2024-03-05 DIAGNOSIS — E66.01 CLASS 3 SEVERE OBESITY WITH SERIOUS COMORBIDITY AND BODY MASS INDEX (BMI) OF 45.0 TO 49.9 IN ADULT, UNSPECIFIED OBESITY TYPE (H): ICD-10-CM

## 2024-03-05 DIAGNOSIS — M79.10 MUSCLE ACHE: ICD-10-CM

## 2024-03-05 DIAGNOSIS — E55.9 VITAMIN D DEFICIENCY: ICD-10-CM

## 2024-03-05 DIAGNOSIS — Z13.220 LIPID SCREENING: ICD-10-CM

## 2024-03-05 DIAGNOSIS — E66.813 CLASS 3 SEVERE OBESITY WITH SERIOUS COMORBIDITY AND BODY MASS INDEX (BMI) OF 45.0 TO 49.9 IN ADULT, UNSPECIFIED OBESITY TYPE (H): ICD-10-CM

## 2024-03-05 DIAGNOSIS — I10 ESSENTIAL HYPERTENSION: ICD-10-CM

## 2024-03-05 DIAGNOSIS — Z13.29 SCREENING FOR THYROID DISORDER: ICD-10-CM

## 2024-03-05 DIAGNOSIS — R73.03 PREDIABETES: Primary | ICD-10-CM

## 2024-03-05 DIAGNOSIS — L30.4 INTERTRIGO: ICD-10-CM

## 2024-03-05 LAB — HBA1C MFR BLD: 6.3 % (ref 0–5.6)

## 2024-03-05 PROCEDURE — 90471 IMMUNIZATION ADMIN: CPT | Performed by: PHYSICIAN ASSISTANT

## 2024-03-05 PROCEDURE — 80048 BASIC METABOLIC PNL TOTAL CA: CPT | Performed by: PHYSICIAN ASSISTANT

## 2024-03-05 PROCEDURE — 36415 COLL VENOUS BLD VENIPUNCTURE: CPT | Performed by: PHYSICIAN ASSISTANT

## 2024-03-05 PROCEDURE — 90750 HZV VACC RECOMBINANT IM: CPT | Performed by: PHYSICIAN ASSISTANT

## 2024-03-05 PROCEDURE — 84443 ASSAY THYROID STIM HORMONE: CPT | Performed by: PHYSICIAN ASSISTANT

## 2024-03-05 PROCEDURE — 83036 HEMOGLOBIN GLYCOSYLATED A1C: CPT | Performed by: PHYSICIAN ASSISTANT

## 2024-03-05 PROCEDURE — 82306 VITAMIN D 25 HYDROXY: CPT | Performed by: PHYSICIAN ASSISTANT

## 2024-03-05 PROCEDURE — 80061 LIPID PANEL: CPT | Performed by: PHYSICIAN ASSISTANT

## 2024-03-05 PROCEDURE — 99214 OFFICE O/P EST MOD 30 MIN: CPT | Mod: 25 | Performed by: PHYSICIAN ASSISTANT

## 2024-03-05 RX ORDER — CYCLOBENZAPRINE HCL 5 MG
5 TABLET ORAL EVERY 8 HOURS PRN
Qty: 90 TABLET | Refills: 1 | Status: SHIPPED | OUTPATIENT
Start: 2024-03-05

## 2024-03-05 RX ORDER — NYSTATIN 100000 [USP'U]/G
POWDER TOPICAL 2 TIMES DAILY PRN
Qty: 60 G | Refills: 3 | Status: SHIPPED | OUTPATIENT
Start: 2024-03-05

## 2024-03-05 ASSESSMENT — ANXIETY QUESTIONNAIRES
6. BECOMING EASILY ANNOYED OR IRRITABLE: NOT AT ALL
7. FEELING AFRAID AS IF SOMETHING AWFUL MIGHT HAPPEN: NOT AT ALL
1. FEELING NERVOUS, ANXIOUS, OR ON EDGE: NOT AT ALL
3. WORRYING TOO MUCH ABOUT DIFFERENT THINGS: NOT AT ALL
GAD7 TOTAL SCORE: 1
8. IF YOU CHECKED OFF ANY PROBLEMS, HOW DIFFICULT HAVE THESE MADE IT FOR YOU TO DO YOUR WORK, TAKE CARE OF THINGS AT HOME, OR GET ALONG WITH OTHER PEOPLE?: NOT DIFFICULT AT ALL
7. FEELING AFRAID AS IF SOMETHING AWFUL MIGHT HAPPEN: NOT AT ALL
IF YOU CHECKED OFF ANY PROBLEMS ON THIS QUESTIONNAIRE, HOW DIFFICULT HAVE THESE PROBLEMS MADE IT FOR YOU TO DO YOUR WORK, TAKE CARE OF THINGS AT HOME, OR GET ALONG WITH OTHER PEOPLE: NOT DIFFICULT AT ALL
4. TROUBLE RELAXING: SEVERAL DAYS
GAD7 TOTAL SCORE: 1
GAD7 TOTAL SCORE: 1
2. NOT BEING ABLE TO STOP OR CONTROL WORRYING: NOT AT ALL
5. BEING SO RESTLESS THAT IT IS HARD TO SIT STILL: NOT AT ALL

## 2024-03-05 NOTE — PROGRESS NOTES
"  Assessment & Plan     Muscle ache  Occasional back aches  - cyclobenzaprine (FLEXERIL) 5 MG tablet  Dispense: 90 tablet; Refill: 1    Intertrigo  - nystatin (MYCOSTATIN) 090218 UNIT/GM external powder  Dispense: 60 g; Refill: 3    Prediabetes  Will check a1 today  - Hemoglobin A1c  - tirzepatide-Weight Management (ZEPBOUND) 2.5 MG/0.5ML prefilled pen  Dispense: 2 mL; Refill: 1  - Hemoglobin A1c  - Nutrition Referral    Hypertension  Continue losartan and hydrochlorothiazide, ok to d/o metoprolol since she has not been on it for 2 months and has had blood pressures in the normal range  - Basic metabolic panel  (Ca, Cl, CO2, Creat, Gluc, K, Na, BUN)  - Basic metabolic panel  (Ca, Cl, CO2, Creat, Gluc, K, Na, BUN)  - Nutrition Referral    Lipid screening  - Lipid Profile (Chol, Trig, HDL, LDL calc)  - Lipid Profile (Chol, Trig, HDL, LDL calc)    Screening for thyroid disorder  - TSH with free T4 reflex  - TSH with free T4 reflex    Vitamin D Deficiency  - Vitamin D Deficiency  - Vitamin D Deficiency    Class 3 severe obesity with serious comorbidity and body mass index (BMI) of 45.0 to 49.9 in adult, unspecified obesity type (H)  Notes obesity since childhood her insurance had denied the medications she was prescribed last year but she has new insurance this year.  Her significant other passed away last June and she took some time off from addressing her medical problems and is ready to get back into health care for herself  Will send in zepbound prescription.  Pt has HTN and prediabetes comorbitities.   - tirzepatide-Weight Management (ZEPBOUND) 2.5 MG/0.5ML prefilled pen  Dispense: 2 mL; Refill: 1  - Nutrition Referral          BMI  Estimated body mass index is 47.19 kg/m  as calculated from the following:    Height as of this encounter: 1.575 m (5' 2\").    Weight as of this encounter: 117 kg (258 lb).   Weight management plan: will send in weight loss medication       Kaila Licona is a 58 year old, presenting " "for the following health issues:  Recheck Medication (Discuss Weight med )        3/5/2024    10:12 AM   Additional Questions   Roomed by Karoline   Accompanied by self     History of Present Illness       Reason for visit:  Review meds, weight control just a good conversation about health    She eats 2-3 servings of fruits and vegetables daily.She consumes 0 sweetened beverage(s) daily.She exercises with enough effort to increase her heart rate 10 to 19 minutes per day.  She exercises with enough effort to increase her heart rate 3 or less days per week.   She is taking medications regularly.       Weight loss- notes she has been overweight since childhood.  Max weight was 299 lbs.  She has been on metformin but had diarrhea so didn't continue.  She in not interested in surgical procedure    HTN- on losartan and hydrochlorothiazide.  Had also been on metoprolol but has been out for 2 months and has not taken notes her blood pressure has been in the 120s and 130s systolic she would prefer not to take the metoprolol if she does not need it    Back pain- occasional, takes flexeril if needed, would like refill today      Review of Systems  Constitutional, HEENT, cardiovascular, pulmonary, gi and gu systems are negative, except as otherwise noted.      Objective    /82 (BP Location: Left arm, Patient Position: Sitting, Cuff Size: Adult Large)   Pulse 62   Ht 1.575 m (5' 2\")   Wt 117 kg (258 lb)   SpO2 98%   BMI 47.19 kg/m    Body mass index is 47.19 kg/m .  Physical Exam   GENERAL: alert and no distress  NECK: no adenopathy, no asymmetry, masses, or scars  RESP: lungs clear to auscultation - no rales, rhonchi or wheezes  CV: regular rate and rhythm, normal S1 S2, no S3 or S4, no murmur, click or rub, no peripheral edema  MS: no gross musculoskeletal defects noted, no edema            Signed Electronically by: Cailin Hutson PA-C    "

## 2024-03-06 LAB
ANION GAP SERPL CALCULATED.3IONS-SCNC: 8 MMOL/L (ref 7–15)
BUN SERPL-MCNC: 14.2 MG/DL (ref 6–20)
CALCIUM SERPL-MCNC: 10 MG/DL (ref 8.6–10)
CHLORIDE SERPL-SCNC: 100 MMOL/L (ref 98–107)
CHOLEST SERPL-MCNC: 158 MG/DL
CREAT SERPL-MCNC: 0.79 MG/DL (ref 0.51–0.95)
DEPRECATED HCO3 PLAS-SCNC: 31 MMOL/L (ref 22–29)
EGFRCR SERPLBLD CKD-EPI 2021: 87 ML/MIN/1.73M2
FASTING STATUS PATIENT QL REPORTED: YES
GLUCOSE SERPL-MCNC: 124 MG/DL (ref 70–99)
HDLC SERPL-MCNC: 56 MG/DL
LDLC SERPL CALC-MCNC: 76 MG/DL
NONHDLC SERPL-MCNC: 102 MG/DL
POTASSIUM SERPL-SCNC: 4.8 MMOL/L (ref 3.4–5.3)
SODIUM SERPL-SCNC: 139 MMOL/L (ref 135–145)
TRIGL SERPL-MCNC: 128 MG/DL
TSH SERPL DL<=0.005 MIU/L-ACNC: 2.72 UIU/ML (ref 0.3–4.2)
VIT D+METAB SERPL-MCNC: 26 NG/ML (ref 20–50)

## 2024-04-01 ENCOUNTER — MYC MEDICAL ADVICE (OUTPATIENT)
Dept: FAMILY MEDICINE | Facility: CLINIC | Age: 59
End: 2024-04-01
Payer: COMMERCIAL

## 2024-04-01 DIAGNOSIS — E66.813 CLASS 3 SEVERE OBESITY WITH SERIOUS COMORBIDITY AND BODY MASS INDEX (BMI) OF 45.0 TO 49.9 IN ADULT, UNSPECIFIED OBESITY TYPE (H): ICD-10-CM

## 2024-04-01 DIAGNOSIS — R73.03 PREDIABETES: ICD-10-CM

## 2024-04-01 DIAGNOSIS — E66.01 MORBID OBESITY (H): Primary | ICD-10-CM

## 2024-04-01 DIAGNOSIS — E66.01 CLASS 3 SEVERE OBESITY WITH SERIOUS COMORBIDITY AND BODY MASS INDEX (BMI) OF 45.0 TO 49.9 IN ADULT, UNSPECIFIED OBESITY TYPE (H): ICD-10-CM

## 2024-05-05 ENCOUNTER — MYC MEDICAL ADVICE (OUTPATIENT)
Dept: FAMILY MEDICINE | Facility: CLINIC | Age: 59
End: 2024-05-05
Payer: COMMERCIAL

## 2024-05-05 DIAGNOSIS — E66.01 MORBID OBESITY (H): Primary | ICD-10-CM

## 2024-05-17 DIAGNOSIS — F32.A DEPRESSIVE DISORDER: ICD-10-CM

## 2024-05-17 DIAGNOSIS — I10 HYPERTENSION, UNSPECIFIED TYPE: ICD-10-CM

## 2024-05-17 RX ORDER — ESCITALOPRAM OXALATE 10 MG/1
10 TABLET ORAL DAILY
Qty: 90 TABLET | Refills: 0 | Status: SHIPPED | OUTPATIENT
Start: 2024-05-17 | End: 2024-09-16

## 2024-05-17 RX ORDER — SPIRONOLACTONE 50 MG/1
50 TABLET, FILM COATED ORAL DAILY
Qty: 90 TABLET | Refills: 0 | Status: SHIPPED | OUTPATIENT
Start: 2024-05-17 | End: 2024-09-16

## 2024-05-17 RX ORDER — HYDROCHLOROTHIAZIDE 25 MG/1
25 TABLET ORAL DAILY
Qty: 90 TABLET | Refills: 0 | Status: SHIPPED | OUTPATIENT
Start: 2024-05-17 | End: 2024-09-16

## 2024-05-17 RX ORDER — LOSARTAN POTASSIUM 100 MG/1
100 TABLET ORAL DAILY
Qty: 90 TABLET | Refills: 0 | Status: SHIPPED | OUTPATIENT
Start: 2024-05-17 | End: 2024-09-16

## 2024-06-08 ENCOUNTER — MYC MEDICAL ADVICE (OUTPATIENT)
Dept: FAMILY MEDICINE | Facility: CLINIC | Age: 59
End: 2024-06-08
Payer: COMMERCIAL

## 2024-06-08 DIAGNOSIS — E66.01 MORBID OBESITY (H): Primary | ICD-10-CM

## 2024-06-26 ENCOUNTER — VIRTUAL VISIT (OUTPATIENT)
Dept: FAMILY MEDICINE | Facility: CLINIC | Age: 59
End: 2024-06-26
Payer: COMMERCIAL

## 2024-06-26 DIAGNOSIS — E66.01 MORBID OBESITY (H): Primary | ICD-10-CM

## 2024-06-26 PROCEDURE — 99214 OFFICE O/P EST MOD 30 MIN: CPT | Mod: 95 | Performed by: PHYSICIAN ASSISTANT

## 2024-06-26 PROCEDURE — G2211 COMPLEX E/M VISIT ADD ON: HCPCS | Mod: 95 | Performed by: PHYSICIAN ASSISTANT

## 2024-06-26 NOTE — PROGRESS NOTES
Answers submitted by the patient for this visit:  General Questionnaire (Submitted on 6/25/2024)  Chief Complaint: Chronic problems general questions HPI Form  What is the reason for your visit today? : Weight check in  How many servings of fruits and vegetables do you eat daily?: 4 or more  On average, how many sweetened beverages do you drink each day (Examples: soda, juice, sweet tea, etc.  Do NOT count diet or artificially sweetened beverages)?: 0  How many minutes a day do you exercise enough to make your heart beat faster?: 20 to 29  How many days a week do you exercise enough to make your heart beat faster?: 3 or less  How many days per week do you miss taking your medication?: 0  Monae is a 58 year old who is being evaluated via a billable video visit.    How would you like to obtain your AVS? MyChart  If the video visit is dropped, the invitation should be resent by: Text to cell phone: [phone]  Will anyone else be joining your video visit? No      Assessment & Plan     Morbid obesity (H)  On zepbound 10 mg and tolerating well.  Pharmacy only has 12.5 mg pens in stock, will increase to 12.5 mg  Follow up 6 month, sooner if needed.   - tirzepatide-Weight Management (ZEPBOUND) 12.5 MG/0.5ML prefilled pen  Dispense: 2 mL; Refill: 6    The longitudinal plan of care for the diagnosis(es)/condition(s) as documented were addressed during this visit. Due to the added complexity in care, I will continue to support Monae in the subsequent management and with ongoing continuity of care.        Subjective   Monae is a 58 year old, presenting for the following health issues:  Follow Up (Weight Ck )        6/26/2024     8:16 AM   Additional Questions   Roomed by Emmy     History of Present Illness       Reason for visit:  Weight check in    She eats 4 or more servings of fruits and vegetables daily.She consumes 0 sweetened beverage(s) daily.She exercises with enough effort to increase her heart rate 20 to 29 minutes  per day.  She exercises with enough effort to increase her heart rate 3 or less days per week.   She is taking medications regularly.       Weight loss medication- on zepbound, had nausea and reflux initially that was managed by otc medications.     She is drinking 80-90 ounces of water and has increased protein in her diet, getting in about 40 grams per meal.    She is not currently exercising but does belong to a gym.  Planning to add exercise    Highest weight 299  Initial weight 258   Current weight 234   Goal weight is 160        Review of Systems  Constitutional, HEENT, cardiovascular, pulmonary, gi and gu systems are negative, except as otherwise noted.      Objective    Vitals - Patient Reported  Weight (Patient Reported): 234 lb (106.1 kg)      Vitals:  No vitals were obtained today due to virtual visit.    Physical Exam   GENERAL: alert and no distress  EYES: Eyes grossly normal to inspection.  No discharge or erythema, or obvious scleral/conjunctival abnormalities.  RESP: No audible wheeze, cough, or visible cyanosis.    SKIN: Visible skin clear. No significant rash, abnormal pigmentation or lesions.  NEURO: Cranial nerves grossly intact.  Mentation and speech appropriate for age.  PSYCH: Appropriate affect, tone, and pace of words          Video-Visit Details    Type of service:  Video Visit   Originating Location (pt. Location): Home    Distant Location (provider location):  Off-site  Platform used for Video Visit: Emily  Signed Electronically by: Cailin Hutson PA-C

## 2024-07-30 ENCOUNTER — TELEPHONE (OUTPATIENT)
Dept: FAMILY MEDICINE | Facility: CLINIC | Age: 59
End: 2024-07-30
Payer: COMMERCIAL

## 2024-07-30 NOTE — TELEPHONE ENCOUNTER
Prior Authorization Retail Medication Request    Medication/Dose: tirzepatide-Weight Management (ZEPBOUND) 12.5 MG/0.5ML prefilled pen  Diagnosis and ICD code (if different than what is on RX):  see chart  New/renewal/insurance change PA/secondary ins. PA:  Previously Tried and Failed:  see chart  Rationale:  see chart    Insurance   Primary: BCBS  Insurance ID:  [id number]       Cover My Meds Key: [id number]

## 2024-07-31 NOTE — TELEPHONE ENCOUNTER
Retail Pharmacy Prior Authorization Team   Phone: [phone]    PA Initiation    Medication: tirzepatide-Weight Management (ZEPBOUND) 12.5 MG/0.5ML prefilled pen  Insurance Company: [employer]Specialty [employer]'s - Phone [phone] Fax [phone]  [facility] the Rx[facility] [address] [place] [address] [place]  Filling Pharmacy Phone: [phone]  Filling Pharmacy Fax: [phone]  Start Date: 7/31/2024

## 2024-08-09 NOTE — TELEPHONE ENCOUNTER
Prior Authorization Approval    Authorization Effective Date: 7/9/2024  Authorization Expiration Date: 9/7/2024  Medication: tirzepatide-Weight Management (ZEPBOUND) 12.5 MG/0.5ML prefilled pen - APPROVED  Approved Dose/Quantity:    Reference #:     Insurance Company: [employer]-Specialty PA's - Phone [phone] Fax [phone]  Expected CoPay:       CoPay Card Available:      Foundation Assistance Needed:    Which Pharmacy is filling the prescription (Not needed for infusion/clinic administered): [facility] [place] - [address] [place] [address]  Pharmacy Notified:  YES  Patient Notified:  YES

## 2024-08-12 ENCOUNTER — TELEPHONE (OUTPATIENT)
Dept: FAMILY MEDICINE | Facility: CLINIC | Age: 59
End: 2024-08-12
Payer: COMMERCIAL

## 2024-08-12 DIAGNOSIS — E66.01 MORBID OBESITY (H): Primary | ICD-10-CM

## 2024-08-12 NOTE — TELEPHONE ENCOUNTER
Medication: tirzepatide-Weight Management (ZEPBOUND) 12.5 MG/0.5ML prefilled pen     Pharmacy Comments: Unfortunately 12.5MG is not available on the market/backordered. Next dose is 15MG, patient just finished 12.5MG dose. Please send for 15MG if approved.    [facility] [address] [facility]

## 2024-09-04 NOTE — TELEPHONE ENCOUNTER
Medication Question or Refill    Contacts         Type Contact Phone/Fax    02/22/2024 02:15 PM CST Phone (Incoming) Monae Martinez (Self) [phone] (M)            What medication are you calling about (include dose and sig)?: losartan 100 mg tab  spironolacton 50 mg tab    Preferred Pharmacy:   [id number] - [place], [place] - [address] [facility] [address]  [facility] [address]  [place] [address]  Phone: [phone] Fax: [phone]      Controlled Substance Agreement on file:   CSA -- Patient Level:    CSA: None found at the patient level.       Who prescribed the medication?: PCP     Do you need a refill? Yes    When did you use the medication last? 3 days need refill asap    Patient offered an appointment? Yes: 03/05/24    Do you have any questions or concerns?  Yes: Needs refill asap, appt has been made for 03/05/24.       Could we send this information to you in Flick[employer] or would you prefer to receive a phone call?:   Patient would like to be contacted via FlickIM  
04-Sep-2024 18:43

## 2024-09-15 DIAGNOSIS — I10 HYPERTENSION, UNSPECIFIED TYPE: ICD-10-CM

## 2024-09-15 DIAGNOSIS — F32.A DEPRESSIVE DISORDER: ICD-10-CM

## 2024-09-16 RX ORDER — LOSARTAN POTASSIUM 100 MG/1
100 TABLET ORAL DAILY
Qty: 90 TABLET | Refills: 1 | Status: SHIPPED | OUTPATIENT
Start: 2024-09-16

## 2024-09-16 RX ORDER — ESCITALOPRAM OXALATE 10 MG/1
10 TABLET ORAL DAILY
Qty: 90 TABLET | Refills: 0 | Status: SHIPPED | OUTPATIENT
Start: 2024-09-16

## 2024-09-16 RX ORDER — SPIRONOLACTONE 50 MG/1
50 TABLET, FILM COATED ORAL DAILY
Qty: 90 TABLET | Refills: 1 | Status: SHIPPED | OUTPATIENT
Start: 2024-09-16

## 2024-09-16 RX ORDER — HYDROCHLOROTHIAZIDE 25 MG/1
25 TABLET ORAL DAILY
Qty: 90 TABLET | Refills: 1 | Status: SHIPPED | OUTPATIENT
Start: 2024-09-16

## 2024-11-06 ENCOUNTER — TELEPHONE (OUTPATIENT)
Dept: FAMILY MEDICINE | Facility: CLINIC | Age: 59
End: 2024-11-06
Payer: COMMERCIAL

## 2024-11-06 NOTE — TELEPHONE ENCOUNTER
Prior Authorization  Please initiate PA, med/dosage not covered by insurance.    Medication: tirzepatide-Weight Management (ZEPBOUND) 15 MG/0.5ML prefilled pen     Insurance Name: [employer] [place]  Insurance ID: [id number]  Cover My Meds Key: [id number]    [facility] [address]

## 2024-11-07 NOTE — TELEPHONE ENCOUNTER
Central Prior Authorization Team - Phone: [phone]     PA Initiation    Medication: ZEPBOUND 15 MG/0.5ML SC SOAJ  Insurance Company: [employer] Non-Specialty PA's - Phone [phone] Fax [phone]  Pharmacy Filling the Rx: [facility] - [address] [facility]  Filling Pharmacy Phone: [phone]  Filling Pharmacy Fax:    Start Date: 11/7/2024

## 2024-11-13 NOTE — TELEPHONE ENCOUNTER
Central Prior Authorization Team - Phone: [phone]     Prior Authorization Approval    Medication: ZEPBOUND 15 MG/0.5ML SC SOAJ  Authorization Effective Date: 10/8/2024  Authorization Expiration Date: 7/5/2025  Approved Dose/Quantity: 2  Reference #:     Insurance Company: [employer] Non-Specialty PA's - Phone [phone] Fax [phone]  Expected CoPay: $    CoPay Card Available:      Financial Assistance Needed:   Which Pharmacy is filling the prescription: [facility] - [place] - [address] [place] [address]  Pharmacy Notified: YES  Patient Notified: YES Instructed pharmacy to notify patient once order is ready.

## 2024-12-26 DIAGNOSIS — F32.A DEPRESSIVE DISORDER: ICD-10-CM

## 2024-12-26 RX ORDER — ESCITALOPRAM OXALATE 10 MG/1
10 TABLET ORAL DAILY
Qty: 90 TABLET | Refills: 0 | Status: SHIPPED | OUTPATIENT
Start: 2024-12-26

## 2025-01-27 DIAGNOSIS — E66.01 MORBID OBESITY (H): ICD-10-CM

## 2025-01-27 RX ORDER — TIRZEPATIDE 15 MG/.5ML
INJECTION, SOLUTION SUBCUTANEOUS
Qty: 2 ML | Refills: 5 | Status: SHIPPED | OUTPATIENT
Start: 2025-01-27

## 2025-03-12 ENCOUNTER — TELEPHONE (OUTPATIENT)
Dept: FAMILY MEDICINE | Facility: CLINIC | Age: 60
End: 2025-03-12
Payer: COMMERCIAL

## 2025-03-12 NOTE — TELEPHONE ENCOUNTER
Patient Quality Outreach    Patient is due for the following:   Hypertension -  Hypertension follow-up visit  Breast Cancer Screening - Mammogram  Depression  -  PHQ-9 needed  Physical Preventive Adult Physical    Action(s) Taken:    left voice mail and sent MyChart      Type of outreach:    Phone, left message for patient/parent to call back.    Questions for provider review:    None           Karoline Mas MA

## 2025-03-15 ENCOUNTER — HEALTH MAINTENANCE LETTER (OUTPATIENT)
Age: 60
End: 2025-03-15

## 2025-03-31 DIAGNOSIS — F32.A DEPRESSIVE DISORDER: ICD-10-CM

## 2025-04-01 RX ORDER — ESCITALOPRAM OXALATE 10 MG/1
10 TABLET ORAL DAILY
Qty: 90 TABLET | Refills: 0 | Status: SHIPPED | OUTPATIENT
Start: 2025-04-01

## 2025-04-05 ENCOUNTER — HEALTH MAINTENANCE LETTER (OUTPATIENT)
Age: 60
End: 2025-04-05

## 2025-04-14 ENCOUNTER — MYC REFILL (OUTPATIENT)
Dept: FAMILY MEDICINE | Facility: CLINIC | Age: 60
End: 2025-04-14
Payer: COMMERCIAL

## 2025-04-14 DIAGNOSIS — F32.A DEPRESSIVE DISORDER: ICD-10-CM

## 2025-04-14 DIAGNOSIS — I10 HYPERTENSION, UNSPECIFIED TYPE: ICD-10-CM

## 2025-04-14 RX ORDER — ESCITALOPRAM OXALATE 10 MG/1
10 TABLET ORAL DAILY
Qty: 90 TABLET | Refills: 0 | OUTPATIENT
Start: 2025-04-14

## 2025-04-15 RX ORDER — SPIRONOLACTONE 50 MG/1
50 TABLET, FILM COATED ORAL DAILY
Qty: 90 TABLET | Refills: 1 | Status: SHIPPED | OUTPATIENT
Start: 2025-04-15

## 2025-04-15 RX ORDER — LOSARTAN POTASSIUM 100 MG/1
100 TABLET ORAL DAILY
Qty: 90 TABLET | Refills: 1 | Status: SHIPPED | OUTPATIENT
Start: 2025-04-15

## 2025-04-15 RX ORDER — HYDROCHLOROTHIAZIDE 25 MG/1
25 TABLET ORAL DAILY
Qty: 90 TABLET | Refills: 1 | Status: SHIPPED | OUTPATIENT
Start: 2025-04-15

## 2025-05-27 SDOH — HEALTH STABILITY: PHYSICAL HEALTH: ON AVERAGE, HOW MANY DAYS PER WEEK DO YOU ENGAGE IN MODERATE TO STRENUOUS EXERCISE (LIKE A BRISK WALK)?: 7 DAYS

## 2025-05-27 SDOH — HEALTH STABILITY: PHYSICAL HEALTH: ON AVERAGE, HOW MANY MINUTES DO YOU ENGAGE IN EXERCISE AT THIS LEVEL?: 30 MIN

## 2025-05-27 ASSESSMENT — SOCIAL DETERMINANTS OF HEALTH (SDOH): HOW OFTEN DO YOU GET TOGETHER WITH FRIENDS OR RELATIVES?: MORE THAN THREE TIMES A WEEK

## 2025-05-28 ASSESSMENT — ANXIETY QUESTIONNAIRES
7. FEELING AFRAID AS IF SOMETHING AWFUL MIGHT HAPPEN: NOT AT ALL
3. WORRYING TOO MUCH ABOUT DIFFERENT THINGS: NOT AT ALL
IF YOU CHECKED OFF ANY PROBLEMS ON THIS QUESTIONNAIRE, HOW DIFFICULT HAVE THESE PROBLEMS MADE IT FOR YOU TO DO YOUR WORK, TAKE CARE OF THINGS AT HOME, OR GET ALONG WITH OTHER PEOPLE: NOT DIFFICULT AT ALL
4. TROUBLE RELAXING: NOT AT ALL
GAD7 TOTAL SCORE: 0
8. IF YOU CHECKED OFF ANY PROBLEMS, HOW DIFFICULT HAVE THESE MADE IT FOR YOU TO DO YOUR WORK, TAKE CARE OF THINGS AT HOME, OR GET ALONG WITH OTHER PEOPLE?: NOT DIFFICULT AT ALL
7. FEELING AFRAID AS IF SOMETHING AWFUL MIGHT HAPPEN: NOT AT ALL
2. NOT BEING ABLE TO STOP OR CONTROL WORRYING: NOT AT ALL
5. BEING SO RESTLESS THAT IT IS HARD TO SIT STILL: NOT AT ALL
1. FEELING NERVOUS, ANXIOUS, OR ON EDGE: NOT AT ALL
6. BECOMING EASILY ANNOYED OR IRRITABLE: NOT AT ALL
GAD7 TOTAL SCORE: 0
GAD7 TOTAL SCORE: 0

## 2025-05-28 ASSESSMENT — PATIENT HEALTH QUESTIONNAIRE - PHQ9
10. IF YOU CHECKED OFF ANY PROBLEMS, HOW DIFFICULT HAVE THESE PROBLEMS MADE IT FOR YOU TO DO YOUR WORK, TAKE CARE OF THINGS AT HOME, OR GET ALONG WITH OTHER PEOPLE: NOT DIFFICULT AT ALL
SUM OF ALL RESPONSES TO PHQ QUESTIONS 1-9: 1
SUM OF ALL RESPONSES TO PHQ QUESTIONS 1-9: 1

## 2025-05-29 ENCOUNTER — OFFICE VISIT (OUTPATIENT)
Dept: FAMILY MEDICINE | Facility: CLINIC | Age: 60
End: 2025-05-29
Payer: COMMERCIAL

## 2025-05-29 VITALS
RESPIRATION RATE: 18 BRPM | OXYGEN SATURATION: 97 % | TEMPERATURE: 97.3 F | DIASTOLIC BLOOD PRESSURE: 74 MMHG | SYSTOLIC BLOOD PRESSURE: 122 MMHG | BODY MASS INDEX: 37.54 KG/M2 | HEIGHT: 62 IN | HEART RATE: 68 BPM | WEIGHT: 204 LBS

## 2025-05-29 DIAGNOSIS — E66.01 MORBID OBESITY (H): ICD-10-CM

## 2025-05-29 DIAGNOSIS — I10 HYPERTENSION, UNSPECIFIED TYPE: ICD-10-CM

## 2025-05-29 DIAGNOSIS — Z00.00 ROUTINE GENERAL MEDICAL EXAMINATION AT A HEALTH CARE FACILITY: ICD-10-CM

## 2025-05-29 DIAGNOSIS — I10 ESSENTIAL HYPERTENSION: Primary | ICD-10-CM

## 2025-05-29 DIAGNOSIS — Z78.0 MENOPAUSE: ICD-10-CM

## 2025-05-29 DIAGNOSIS — E55.9 VITAMIN D DEFICIENCY: ICD-10-CM

## 2025-05-29 DIAGNOSIS — F32.A DEPRESSIVE DISORDER: ICD-10-CM

## 2025-05-29 DIAGNOSIS — R73.03 PREDIABETES: ICD-10-CM

## 2025-05-29 LAB
EST. AVERAGE GLUCOSE BLD GHB EST-MCNC: 91 MG/DL
HBA1C MFR BLD: 4.8 % (ref 0–5.6)

## 2025-05-29 RX ORDER — LOSARTAN POTASSIUM 100 MG/1
100 TABLET ORAL DAILY
Qty: 90 TABLET | Refills: 1 | Status: SHIPPED | OUTPATIENT
Start: 2025-05-29

## 2025-05-29 RX ORDER — SPIRONOLACTONE 50 MG/1
50 TABLET, FILM COATED ORAL DAILY
Qty: 90 TABLET | Refills: 1 | Status: SHIPPED | OUTPATIENT
Start: 2025-05-29

## 2025-05-29 RX ORDER — HYDROCHLOROTHIAZIDE 25 MG/1
25 TABLET ORAL DAILY
Qty: 90 TABLET | Refills: 1 | Status: SHIPPED | OUTPATIENT
Start: 2025-05-29

## 2025-05-29 RX ORDER — ESCITALOPRAM OXALATE 10 MG/1
10 TABLET ORAL DAILY
Qty: 90 TABLET | Refills: 3 | Status: SHIPPED | OUTPATIENT
Start: 2025-05-29

## 2025-05-29 NOTE — PATIENT INSTRUCTIONS
Patient Education   Preventive Care Advice   This is general advice given by our system to help you stay healthy. However, your care team may have specific advice just for you. Please talk to your care team about your preventive care needs.  Nutrition  Eat 5 or more servings of fruits and vegetables each day.  Try wheat bread, brown rice and whole grain pasta (instead of white bread, rice, and pasta).  Get enough calcium and vitamin D. Check the label on foods and aim for 100% of the RDA (recommended daily allowance).  Lifestyle  Exercise at least 150 minutes each week  (30 minutes a day, 5 days a week).  Do muscle strengthening activities 2 days a week. These help control your weight and prevent disease.  No smoking.  Wear sunscreen to prevent skin cancer.  Have a dental exam and cleaning every 6 months.  Yearly exams  See your health care team every year to talk about:  Any changes in your health.  Any medicines your care team has prescribed.  Preventive care, family planning, and ways to prevent chronic diseases.  Shots (vaccines)   HPV shots (up to age 26), if you've never had them before.  Hepatitis B shots (up to age 59), if you've never had them before.  COVID-19 shot: Get this shot when it's due.  Flu shot: Get a flu shot every year.  Tetanus shot: Get a tetanus shot every 10 years.  Pneumococcal, hepatitis A, and RSV shots: Ask your care team if you need these based on your risk.  Shingles shot (for age 50 and up)  General health tests  Diabetes screening:  Starting at age 35, Get screened for diabetes at least every 3 years.  If you are younger than age 35, ask your care team if you should be screened for diabetes.  Cholesterol test: At age 39, start having a cholesterol test every 5 years, or more often if advised.  Bone density scan (DEXA): At age 50, ask your care team if you should have this scan for osteoporosis (brittle bones).  Hepatitis C: Get tested at least once in your life.  STIs (sexually  transmitted infections)  Before age 24: Ask your care team if you should be screened for STIs.  After age 24: Get screened for STIs if you're at risk. You are at risk for STIs (including HIV) if:  You are sexually active with more than one person.  You don't use condoms every time.  You or a partner was diagnosed with a sexually transmitted infection.  If you are at risk for HIV, ask about PrEP medicine to prevent HIV.  Get tested for HIV at least once in your life, whether you are at risk for HIV or not.  Cancer screening tests  Cervical cancer screening: If you have a cervix, begin getting regular cervical cancer screening tests starting at age 21.  Breast cancer scan (mammogram): If you've ever had breasts, begin having regular mammograms starting at age 40. This is a scan to check for breast cancer.  Colon cancer screening: It is important to start screening for colon cancer at age 45.  Have a colonoscopy test every 10 years (or more often if you're at risk) Or, ask your provider about stool tests like a FIT test every year or Cologuard test every 3 years.  To learn more about your testing options, visit:   .  For help making a decision, visit:   https://bit.ly/sd70384.  Prostate cancer screening test: If you have a prostate, ask your care team if a prostate cancer screening test (PSA) at age 55 is right for you.  Lung cancer screening: If you are a current or former smoker ages 50 to 80, ask your care team if ongoing lung cancer screenings are right for you.  For informational purposes only. Not to replace the advice of your health care provider. Copyright   2023 [employer]. All rights reserved. Clinically reviewed by the [facility]. [employer] [id number] - REV 01/24.

## 2025-05-29 NOTE — PROGRESS NOTES
"Preventive Care Visit  [facility] NIESHA Hutson PA-C, Family Medicine  May 29, 2025      Assessment & Plan     Essential hypertension  Well controlled  - BASIC METABOLIC PANEL  - BASIC METABOLIC PANEL    Depressive disorder  Well controlled  - escitalopram (LEXAPRO) 10 MG tablet  Dispense: 90 tablet; Refill: 3    Routine general medical examination at a health care facility    Morbid obesity (H)  Has lost 50 lbs with zepbound and has now plateaued, she is walking and monitoring her diet.  Recommend adding in strength training 3 times a week to build muscle to increase metabolism  - tirzepatide-Weight Management (ZEPBOUND) 15 MG/0.5ML prefilled pen  Dispense: 2 mL; Refill: 11    Hypertension, unspecified type  Well controlled  - hydrochlorothiazide (HYDRODIURIL) 25 MG tablet  Dispense: 90 tablet; Refill: 1  - losartan (COZAAR) 100 MG tablet  Dispense: 90 tablet; Refill: 1  - spironolactone (ALDACTONE) 50 MG tablet  Dispense: 90 tablet; Refill: 1    Prediabetes  Labs today  - Hemoglobin A1c  - Hemoglobin A1c    Menopause  S/p hysterectomy for uterine cancer.  Saw [facility].  Would like to know if she is in menopause  - Follicle stimulating hormone  - Follicle stimulating hormone    Vitamin D deficiency  - Vitamin D Deficiency  - Vitamin D Deficiency    The longitudinal plan of care for the diagnosis(es)/condition(s) as documented were addressed during this visit. Due to the added complexity in care, I will continue to support Monae in the subsequent management and with ongoing continuity of care.              BMI  Estimated body mass index is 37.61 kg/m  as calculated from the following:    Height as of this encounter: 1.568 m (5' 1.75\").    Weight as of this encounter: 92.5 kg (204 lb).       Counseling  Appropriate preventive services were addressed with this patient via screening, questionnaire, or discussion as appropriate for fall prevention, nutrition, physical activity, Tobacco-use " cessation, social engagement, weight loss and cognition.  Checklist reviewing preventive services available has been given to the patient.  Reviewed patient's diet, addressing concerns and/or questions.           Subjective   Monae is a 59 year old, presenting for the following:  Physical (Discuss weight med)        5/29/2025     2:42 PM   Additional Questions   Roomed by RONALD Ramey   Accompanied by self          HPI    Weight loss- 258  Current weight- 204  Has lost 55 lbs and is stable at her current weight. She has increased her water intake, adjusted portion sizes.   She walks her dogs daily.    Advance Care Planning    Discussed advance care planning with patient; however, patient declined at this time.        5/27/2025   General Health   How would you rate your overall physical health? Good   Feel stress (tense, anxious, or unable to sleep) Only a little   (!) STRESS CONCERN      5/27/2025   Nutrition   Three or more servings of calcium each day? Yes   Diet: Regular (no restrictions)   How many servings of fruit and vegetables per day? (!) 2-3   How many sweetened beverages each day? 0-1         5/27/2025   Exercise   Days per week of moderate/strenous exercise 7 days   Average minutes spent exercising at this level 30 min         5/27/2025   Social Factors   Frequency of gathering with friends or relatives More than three times a week   Worry food won't last until get money to buy more No   Food not last or not have enough money for food? No   Do you have housing? (Housing is defined as stable permanent housing and does not include staying outside in a car, in a tent, in an abandoned building, in an overnight shelter, or couch-surfing.) Yes   Are you worried about losing your housing? No   Lack of transportation? No   Unable to get utilities (heat,electricity)? No         5/27/2025   Fall Risk   Fallen 2 or more times in the past year? No   Trouble with walking or balance? No          5/27/2025   Dental  "  Dentist two times every year? Yes       Today's PHQ-9 Score:       5/28/2025     8:42 PM   PHQ-9 SCORE   PHQ-9 Total Score MyChart 1 (Minimal depression)   PHQ-9 Total Score 1        Patient-reported         5/27/2025   Substance Use   Alcohol more than 3/day or more than 7/wk Not Applicable   Do you use any other substances recreationally? No     Social History     Tobacco Use    Smoking status: Never     Passive exposure: Never    Smokeless tobacco: Never   Vaping Use    Vaping status: Never Used   Substance Use Topics    Alcohol use: No     Comment: Alcoholic Drinks/day: once a month    Drug use: No           1/17/2023   LAST FHS-7 RESULTS   1st degree relative breast or ovarian cancer No   Any relative bilateral breast cancer No   Any male have breast cancer No   Any ONE woman have BOTH breast AND ovarian cancer No   Any woman with breast cancer before 50yrs No   2 or more relatives with breast AND/OR ovarian cancer No   2 or more relatives with breast AND/OR bowel cancer No                5/27/2025   STI Screening   New sexual partner(s) since last STI/HIV test? No     History of abnormal Pap smear: Status post hysterectomy with removal of cervix and no history of CIN2 or greater or cervical cancer. Health Maintenance and Surgical History updated.       ASCVD Risk   The 10-year ASCVD risk score (Naty DK, et al., 2019) is: 2.6%    Values used to calculate the score:      Age: 59 years      Sex: Female      Is Non- : No      Diabetic: No      Tobacco smoker: No      Systolic Blood Pressure: 122 mmHg      Is BP treated: Yes      HDL Cholesterol: 56 mg/dL      Total Cholesterol: 158 mg/dL           Reviewed and updated as needed this visit by Provider   Tobacco  Allergies  Meds  Problems  Med Hx  Surg Hx  Fam Hx                     Objective    Exam  /74   Pulse 68   Temp 97.3  F (36.3  C)   Resp 18   Ht 1.568 m (5' 1.75\")   Wt 92.5 kg (204 lb)   SpO2 97%   " "BMI 37.61 kg/m     Estimated body mass index is 37.61 kg/m  as calculated from the following:    Height as of this encounter: 1.568 m (5' 1.75\").    Weight as of this encounter: 92.5 kg (204 lb).    Physical Exam  GENERAL: alert and no distress  EYES: Eyes grossly normal to inspection, PERRL and conjunctivae and sclerae normal  HENT: ear canals and TM's normal, nose and mouth without ulcers or lesions  NECK: no adenopathy, no asymmetry, masses, or scars  RESP: lungs clear to auscultation - no rales, rhonchi or wheezes  CV: regular rate and rhythm, normal S1 S2, no S3 or S4, no murmur, click or rub, no peripheral edema  ABDOMEN: soft, nontender, no hepatosplenomegaly, no masses and bowel sounds normal  MS: no gross musculoskeletal defects noted, no edema  SKIN: no suspicious lesions or rashes  NEURO: Normal strength and tone, mentation intact and speech normal  PSYCH: mentation appears normal, affect normal/bright        Signed Electronically by: Cailin Hutson PA-C    Answers submitted by the patient for this visit:  Patient Health Questionnaire (Submitted on 5/28/2025)  If you checked off any problems, how difficult have these problems made it for you to do your work, take care of things at home, or get along with other people?: Not difficult at all  PHQ9 TOTAL SCORE: 1  Patient Health Questionnaire (G7) (Submitted on 5/28/2025)  KEYANNA 7 TOTAL SCORE: 0    "

## 2025-06-02 ENCOUNTER — RESULTS FOLLOW-UP (OUTPATIENT)
Dept: FAMILY MEDICINE | Facility: CLINIC | Age: 60
End: 2025-06-02

## 2025-08-04 ENCOUNTER — TELEPHONE (OUTPATIENT)
Dept: FAMILY MEDICINE | Facility: CLINIC | Age: 60
End: 2025-08-04
Payer: COMMERCIAL